# Patient Record
Sex: FEMALE | Race: WHITE | NOT HISPANIC OR LATINO | Employment: FULL TIME | URBAN - METROPOLITAN AREA
[De-identification: names, ages, dates, MRNs, and addresses within clinical notes are randomized per-mention and may not be internally consistent; named-entity substitution may affect disease eponyms.]

---

## 2017-02-10 ENCOUNTER — TELEPHONE (OUTPATIENT)
Dept: OBSTETRICS AND GYNECOLOGY | Age: 37
End: 2017-02-10

## 2017-02-10 RX ORDER — BUPROPION HYDROCHLORIDE 150 MG/1
150 TABLET, EXTENDED RELEASE ORAL 2 TIMES DAILY
Qty: 60 TABLET | Refills: 2 | Status: SHIPPED | OUTPATIENT
Start: 2017-02-10 | End: 2017-03-07 | Stop reason: SDUPTHER

## 2017-02-10 NOTE — TELEPHONE ENCOUNTER
Dr HERMAN  had changed her bcp in Oct because she was having 9 d periods(on Alyacen) to Junel,, ever since she changed bcp like clockwork every month mid cycle she gets a yeast infection, could it be related to bcp.  Also dr herman Rx'd prozac but she quit taken because she had no libido, she has researched that Wellbutrin does not have that effect and requests to have Wellbutrin called in too.

## 2017-02-10 NOTE — TELEPHONE ENCOUNTER
I sent in Wellbutrin.  Not sure about this midcycle vaginitis, if she wants, we can bring her in to do cultures to r/o other causes of the vaginitis, or we can try a different pill to see if bleeding stays controlled and eliminates the vaginal infection

## 2017-02-14 ENCOUNTER — OFFICE VISIT (OUTPATIENT)
Dept: OBSTETRICS AND GYNECOLOGY | Age: 37
End: 2017-02-14

## 2017-02-14 VITALS
BODY MASS INDEX: 21.77 KG/M2 | SYSTOLIC BLOOD PRESSURE: 118 MMHG | DIASTOLIC BLOOD PRESSURE: 72 MMHG | HEIGHT: 59 IN | WEIGHT: 108 LBS

## 2017-02-14 DIAGNOSIS — N92.0 MENORRHAGIA WITH REGULAR CYCLE: ICD-10-CM

## 2017-02-14 DIAGNOSIS — Z30.09 CONTRACEPTIVE EDUCATION: ICD-10-CM

## 2017-02-14 DIAGNOSIS — N76.0 ACUTE VAGINITIS: Primary | ICD-10-CM

## 2017-02-14 PROCEDURE — 99213 OFFICE O/P EST LOW 20 MIN: CPT | Performed by: PHYSICIAN ASSISTANT

## 2017-02-14 RX ORDER — FLUCONAZOLE 150 MG/1
150 TABLET ORAL DAILY
Qty: 2 TABLET | Refills: 0 | Status: SHIPPED | OUTPATIENT
Start: 2017-02-14 | End: 2020-01-07

## 2017-02-14 RX ORDER — FLUTICASONE PROPIONATE 0.05 MG/G
OINTMENT TOPICAL 2 TIMES DAILY
Qty: 1 EACH | Refills: 0 | Status: SHIPPED | OUTPATIENT
Start: 2017-02-14 | End: 2020-01-07

## 2017-02-14 NOTE — PROGRESS NOTES
"Subjective     Chief Complaint   Patient presents with   • Vaginitis     c/o yeast infections more frequent since starting new pill in oct.       Opal Morales is a 37 y.o. No obstetric history on file. whose LMP is Patient's last menstrual period was 01/24/2017 (approximate). presents with recurrent vaginitis issues since she was changed off her previous BCP to improve cycle control (October 2016).  She has noted this since she started on her new pills.  Her bleeding is better but she is tired of the SE.  She has taken a diflucan x 2, most recent was this past Friday.   +d/c, white.  NO VD risk.  She is tired of taking the pill and has noted heavier than usual periods.  She is doing having children and is requesting an ablation    She is otherwise healthy and utd on her exams    She is a regular pt of Dr Cote, new to me.      No Additional Complaints Reported    The following portions of the patient's history were reviewed and updated as appropriate:vital signs, allergies, current medications, past medical history, past social history, past surgical history and problem list      Review of Systems   Pertinent items are noted in HPI.     Objective        Visit Vitals   • /72   • Ht 59\" (149.9 cm)   • Wt 108 lb (49 kg)   • LMP 01/24/2017 (Approximate)   • Breastfeeding No   • BMI 21.81 kg/m2       Physical Exam    General:   alert, comfortable and no distress   Heart: Not performed today   Lungs: Not performed today.   Breast: Not performed today   Neck: na   Abdomen: {Not performed today   CVA: Not performed today   Pelvis: Vaginal: normal mucosa without prolapse or lesions, normal rugae and discharge, white, ph nl, no odor noted, culture obtained  Cervix: normal appearance   Extremities: Not performed today   Neurologic: negative   Psychiatric: Normal affect, judgement, and mood       Lab Review   Labs: No data reviewed     Imaging   No data reviewed    Assessment/Plan     ASSESSMENT  1. Acute vaginitis  "   2. Menorrhagia with regular cycle    3. Contraceptive education        PLAN  1. Plan another round of diflucan and await vaginal cultures  2. Disc ablation, she would need biopsy and possible u/s prior to procedure.  Enc to schedule this with Dr Cantu.  I also discussed that she would need another form of BC and gave her HO to review the ablation and TL  3. Gave sample of Lo Loestrin in meantime as she wants something with lower hormones    Follow up: 4 week(s)    COLTON Joe  2/14/2017

## 2017-02-17 LAB
A VAGINAE DNA VAG QL NAA+PROBE: NORMAL SCORE
BVAB2 DNA VAG QL NAA+PROBE: NORMAL SCORE
C ALBICANS DNA VAG QL NAA+PROBE: NEGATIVE
C GLABRATA DNA VAG QL NAA+PROBE: NEGATIVE
MEGA1 DNA VAG QL NAA+PROBE: NORMAL SCORE

## 2017-03-07 RX ORDER — BUPROPION HYDROCHLORIDE 150 MG/1
150 TABLET, EXTENDED RELEASE ORAL 2 TIMES DAILY
Qty: 180 TABLET | Refills: 2 | Status: SHIPPED | OUTPATIENT
Start: 2017-03-07 | End: 2017-11-27 | Stop reason: SDUPTHER

## 2017-03-22 ENCOUNTER — TELEPHONE (OUTPATIENT)
Dept: OBSTETRICS AND GYNECOLOGY | Age: 37
End: 2017-03-22

## 2017-03-22 ENCOUNTER — PROCEDURE VISIT (OUTPATIENT)
Dept: OBSTETRICS AND GYNECOLOGY | Age: 37
End: 2017-03-22

## 2017-03-22 ENCOUNTER — OFFICE VISIT (OUTPATIENT)
Dept: OBSTETRICS AND GYNECOLOGY | Age: 37
End: 2017-03-22

## 2017-03-22 VITALS
BODY MASS INDEX: 21.17 KG/M2 | SYSTOLIC BLOOD PRESSURE: 102 MMHG | WEIGHT: 105 LBS | HEIGHT: 59 IN | DIASTOLIC BLOOD PRESSURE: 64 MMHG

## 2017-03-22 DIAGNOSIS — Z01.419 ENCOUNTER FOR GYNECOLOGICAL EXAMINATION: Primary | ICD-10-CM

## 2017-03-22 DIAGNOSIS — N92.6 IRREGULAR MENSES: Primary | ICD-10-CM

## 2017-03-22 DIAGNOSIS — N92.0 MENORRHAGIA WITH REGULAR CYCLE: ICD-10-CM

## 2017-03-22 PROCEDURE — 76830 TRANSVAGINAL US NON-OB: CPT | Performed by: OBSTETRICS & GYNECOLOGY

## 2017-03-22 PROCEDURE — 99213 OFFICE O/P EST LOW 20 MIN: CPT | Performed by: OBSTETRICS & GYNECOLOGY

## 2017-03-22 RX ORDER — SODIUM CHLORIDE 0.9 % (FLUSH) 0.9 %
1-10 SYRINGE (ML) INJECTION AS NEEDED
Status: CANCELLED | OUTPATIENT
Start: 2017-03-22

## 2017-03-22 NOTE — PROGRESS NOTES
Subjective         History of Present Illness  Opal Morales is a 37 y.o. female is being seen today for   .        The following portions of the patient's history were reviewed and updated as appropriate: allergies, current medications, past family history, past medical history, past social history, past surgical history and problem list.    PAST MEDICAL HISTORY  No past medical history on file.  OB History     No data available        No past surgical history on file.  No family history on file.  History   Smoking Status   • Never Smoker   Smokeless Tobacco   • Not on file       Current Outpatient Prescriptions:   •  buPROPion SR (WELLBUTRIN SR) 150 MG 12 hr tablet, Take 1 tablet by mouth 2 (Two) Times a Day., Disp: 180 tablet, Rfl: 2  •  fluticasone (CUTIVATE) 0.005 % ointment, Apply  topically 2 (Two) Times a Day., Disp: 1 each, Rfl: 0  •  Norethin-Eth Estrad-Fe Biphas (LO LOESTRIN FE) 1 MG-10 MCG / 10 MCG tablet, Take  by mouth., Disp: , Rfl:   •  fluconazole (DIFLUCAN) 150 MG tablet, Take 1 tablet by mouth Daily. Take one tablet today and repeat in three days if still symptomatic, Disp: 2 tablet, Rfl: 0    There is no immunization history on file for this patient.    Review of Systems       Except as outlined in history of physical illness, patient denies any changes in her GYN, , GI systems. All other systems reviewed are negative.    Objective   Physical Exam      Assessment/Plan   Opal was seen today for gynecologic exam.    Diagnoses and all orders for this visit:    Encounter for gynecological examination  -     IGP, Apt HPV,rfx 16 / 18,45                 EMR Dragon/ Transcription disclaimer:  Much of the encounter note is an electronic transcription/translation of spoken language to printed text. The electronic translation of spoken language may permit erroneous, or at times, nonessential words or phrases to be inadvertently transcribes; Although i have reviewed the note for such errors, some may  still exist.

## 2017-03-22 NOTE — TELEPHONE ENCOUNTER
----- Message from Argelia Matta sent at 3/22/2017 12:27 PM EDT -----  Dr MCGRATH pt, pt was seen today for pre op appt. Pt called stating that she forgot to ask Dr MCGRATH if he would refill her lo Loestrin Fe that Zaida put her on. Please advise.     Pt#: [phone]  Pharm in chart

## 2017-03-22 NOTE — PROGRESS NOTES
"Subjective     Chief Complaint   Patient presents with   • Gynecologic Exam     Annual         History of Present Illness      Opal Morales is a very pleasant  37 y.o. female who presents for annual exam.  Mammo Exam n/a***, Contraception ocp's, Exercise 4 x wkly.      Obstetric History:  OB History     No data available         Menstrual History:     Patient's last menstrual period was 03/19/2017.       Sexual History:       No past medical history on file.  No past surgical history on file.    SOCIAL Hx:      The following portions of the patient's history were reviewed and updated as appropriate: allergies, current medications, past family history, past medical history, past social history, past surgical history and problem list.    Review of Systems        Except as outlined in history of physical illness, patient denies any changes in her GYN, , GI systems.  All other systems reviewed were negative.         Objective   Physical Exam    /64  Ht 59\" (149.9 cm)  Wt 105 lb (47.6 kg)  LMP 03/19/2017  BMI 21.21 kg/m2    General: Patient is alert and oriented and appears overall healthy  Neck: Is supple without thyromegaly, no carotid bruits and no lymphadenopathy  Lungs: Clear bilaterally, no wheezing, rhonchi, or rales.  Respiratory rate is normal  Breast: Even, symmetrical, no lymphadenopathy, no retraction, no masses or cysts  Heart: Regular rate and rhythm are appreciated, no murmurs or rubs are heard  Abdomen: Is soft, without organomegaly, bowel sounds are positive, there is no                                rebound or guarding and palpation does not produce any discomfort  Back: Nontender without CVA tenderness  Pelvic: External genitalia appear normal and consistent with mature female.  BUS normal                            Vagina is clean dry without discharge and appears adequately estrogenized, no               lesions or masses are present                         Cervix is noninflamed " without discharge or lesions.  There is no cervical motion             tenderness.                Uterus is nonenlarged, without tenderness, and no masses or abnormalities are  present               Adnexa are non-enlarged, non tender               Rectal exam reveals adequate sphincter tone and no masses or lesions are                     appreciated on digital rectal examination.    Patient Active Problem List   Diagnosis   • Anxiety   • Irregular menses                 Assessment/Plan   There are no diagnoses linked to this encounter.    Annual Well Woman Exam    Discussed today's findings and concerns with patient in detail.  Continue to recommend regular exercise to include cardiovascular and resistance training and breast self-exam. Wellness lab, mammography, and pap smear, in accordance with age guidelines.  Nutritional and caloric recommendations discussed.    All of the patient's questions were addressed and answered, I have encouraged her to call for today's test results if she has not received them within 10 days.  Patient is advised to call with any change in her condition or with any other questions, otherwise return in 12 months for annual examination.

## 2017-03-22 NOTE — PROGRESS NOTES
Subjective      Chief complaint menorrhagia    Chief Complaint   Patient presents with   • Gynecologic Exam     pre op :ablation       History of Present Illness  Opal Morales is a 37 y.o. female is being seen today for persistent menorrhagia and ultrasound evaluation of endometrium.  Her menorrhagia has been worsening.  It is brought on by cycles.  It is been unresponsive to other therapy and she would like to proceed with some other form of treatment.  Chief Complaint   Patient presents with   • Gynecologic Exam     pre op :ablation   .        The following portions of the patient's history were reviewed and updated as appropriate: allergies, current medications, past family history, past medical history, past social history, past surgical history and problem list.    PAST MEDICAL HISTORY  No past medical history on file.  OB History     No data available        No past surgical history on file.  No family history on file.  History   Smoking Status   • Never Smoker   Smokeless Tobacco   • Not on file       Current Outpatient Prescriptions:   •  buPROPion SR (WELLBUTRIN SR) 150 MG 12 hr tablet, Take 1 tablet by mouth 2 (Two) Times a Day., Disp: 180 tablet, Rfl: 2  •  fluticasone (CUTIVATE) 0.005 % ointment, Apply  topically 2 (Two) Times a Day., Disp: 1 each, Rfl: 0  •  Norethin-Eth Estrad-Fe Biphas (LO LOESTRIN FE) 1 MG-10 MCG / 10 MCG tablet, Take  by mouth., Disp: , Rfl:   •  fluconazole (DIFLUCAN) 150 MG tablet, Take 1 tablet by mouth Daily. Take one tablet today and repeat in three days if still symptomatic, Disp: 2 tablet, Rfl: 0    There is no immunization history on file for this patient.    Review of Systems       Except as outlined in history of physical illness, patient denies any changes in her GYN, , GI systems. All other systems reviewed are negative.    Objective   Physical Exam  Pelvic: External genitalia appear normal and consistent with mature female.  BUS normal                             Vagina is clean dry without discharge and appears adequately estrogenized, no               lesions or masses are present                         Cervix is noninflamed without discharge or lesions.  There is no cervical motion             tenderness.                Uterus is nonenlarged, without tenderness, and no masses or abnormalities are  present               Adnexa are non-enlarged, non tender               Rectal exam reveals adequate sphincter tone and no masses or lesions are                     appreciated on digital rectal examination.    EMR Dragon/ Transcription disclaimer:  Much of the encounter note is an electronic transcription/translation of spoken language to printed text. The electronic translation of spoken language may permit erroneous, or at times, nonessential words or phrases to be inadvertently transcribes; Although i have reviewed the note for such errors, some may still exist.    Assessment/Plan   Opal was seen today for gynecologic exam.    Diagnoses and all orders for this visit:    Encounter for gynecological examination  -     Cancel: IGP, Apt HPV,rfx 16 / 18,45    Menorrhagia with regular cycle    After reviewing the options.  Patient would like to proceed with endometrial ablation risk benefits alternatives complications and potential delay in diagnosis of endometrial carcinoma reviewed.    Other diagnosis contraceptive management.  We reviewed other forms of contraception that are benefits and their alternatives.  We also discussed in detail permanent sterilization.  She is leaning towards having her  get a vasectomy.    She wants to schedule the ablation and we will do so           EMR Dragon/ Transcription disclaimer:  Much of the encounter note is an electronic transcription/translation of spoken language to printed text. The electronic translation of spoken language may permit erroneous, or at times, nonessential words or phrases to be inadvertently transcribes;  Although i have reviewed the note for such errors, some may still exist.

## 2017-03-22 NOTE — PROGRESS NOTES
Subjective     Chief Complaint   Patient presents with   • Gynecologic Exam     pre op :ablation       History of Present Illness  Opal Morales is a 37 y.o. female is being seen today for   Chief Complaint   Patient presents with   • Gynecologic Exam     pre op :ablation   .        The following portions of the patient's history were reviewed and updated as appropriate: allergies, current medications, past family history, past medical history, past social history, past surgical history and problem list.    PAST MEDICAL HISTORY  No past medical history on file.  OB History     No data available        No past surgical history on file.  No family history on file.  History   Smoking Status   • Never Smoker   Smokeless Tobacco   • Not on file       Current Outpatient Prescriptions:   •  buPROPion SR (WELLBUTRIN SR) 150 MG 12 hr tablet, Take 1 tablet by mouth 2 (Two) Times a Day., Disp: 180 tablet, Rfl: 2  •  fluticasone (CUTIVATE) 0.005 % ointment, Apply  topically 2 (Two) Times a Day., Disp: 1 each, Rfl: 0  •  Norethin-Eth Estrad-Fe Biphas (LO LOESTRIN FE) 1 MG-10 MCG / 10 MCG tablet, Take  by mouth., Disp: , Rfl:   •  fluconazole (DIFLUCAN) 150 MG tablet, Take 1 tablet by mouth Daily. Take one tablet today and repeat in three days if still symptomatic, Disp: 2 tablet, Rfl: 0    There is no immunization history on file for this patient.    Review of Systems       Except as outlined in history of physical illness, patient denies any changes in her GYN, , GI systems. All other systems reviewed are negative.    Objective   Physical Exam      Assessment/Plan   Opal was seen today for gynecologic exam.    Diagnoses and all orders for this visit:    Encounter for gynecological examination  -     IGP, Apt HPV,rfx 16 / 18,45                 EMR Dragon/ Transcription disclaimer:  Much of the encounter note is an electronic transcription/translation of spoken language to printed text. The electronic translation of spoken  language may permit erroneous, or at times, nonessential words or phrases to be inadvertently transcribes; Although i have reviewed the note for such errors, some may still exist.

## 2017-10-09 RX ORDER — NORETHINDRONE AND ETHINYL ESTRADIOL 1 MG-35MCG
KIT ORAL
Qty: 84 TABLET | Refills: 0 | Status: SHIPPED | OUTPATIENT
Start: 2017-10-09 | End: 2019-04-08

## 2017-11-27 RX ORDER — BUPROPION HYDROCHLORIDE 150 MG/1
TABLET, EXTENDED RELEASE ORAL
Qty: 180 TABLET | Refills: 2 | Status: SHIPPED | OUTPATIENT
Start: 2017-11-27 | End: 2018-08-25 | Stop reason: SDUPTHER

## 2018-05-08 RX ORDER — NORETHINDRONE ACETATE AND ETHINYL ESTRADIOL, ETHINYL ESTRADIOL AND FERROUS FUMARATE 1MG-10(24)
KIT ORAL
Qty: 84 TABLET | Refills: 3 | Status: SHIPPED | OUTPATIENT
Start: 2018-05-08 | End: 2019-04-08 | Stop reason: SDUPTHER

## 2018-08-28 RX ORDER — BUPROPION HYDROCHLORIDE 150 MG/1
TABLET, EXTENDED RELEASE ORAL
Qty: 180 TABLET | Refills: 2 | Status: SHIPPED | OUTPATIENT
Start: 2018-08-28 | End: 2019-05-23

## 2019-04-08 RX ORDER — NORETHINDRONE ACETATE AND ETHINYL ESTRADIOL, ETHINYL ESTRADIOL AND FERROUS FUMARATE 1MG-10(24)
KIT ORAL
Qty: 84 TABLET | Refills: 3 | Status: SHIPPED | OUTPATIENT
Start: 2019-04-08 | End: 2019-05-23 | Stop reason: SDUPTHER

## 2019-05-23 ENCOUNTER — OFFICE VISIT (OUTPATIENT)
Dept: OBSTETRICS AND GYNECOLOGY | Age: 39
End: 2019-05-23

## 2019-05-23 VITALS
DIASTOLIC BLOOD PRESSURE: 70 MMHG | WEIGHT: 110 LBS | SYSTOLIC BLOOD PRESSURE: 120 MMHG | BODY MASS INDEX: 22.18 KG/M2 | HEIGHT: 59 IN

## 2019-05-23 DIAGNOSIS — F41.9 ANXIETY: ICD-10-CM

## 2019-05-23 DIAGNOSIS — Z01.419 ENCOUNTER FOR GYNECOLOGICAL EXAMINATION: Primary | ICD-10-CM

## 2019-05-23 PROCEDURE — 99396 PREV VISIT EST AGE 40-64: CPT | Performed by: NURSE PRACTITIONER

## 2019-05-23 RX ORDER — SERTRALINE HYDROCHLORIDE 25 MG/1
25 TABLET, FILM COATED ORAL DAILY
Qty: 30 TABLET | Refills: 11 | Status: SHIPPED | OUTPATIENT
Start: 2019-05-23 | End: 2019-06-14 | Stop reason: SDUPTHER

## 2019-05-23 NOTE — PROGRESS NOTES
"Subjective       History of Present Illness    Chief Complaint   Patient presents with   • Gynecologic Exam     annual visit , last pap 16 Ascus , hpv neg , wants to discuss other meds than wellbutrin , doesnt feel like is working .       Opal Morales is a 39 y.o. female who presents for annual exam. No problems other than some anxiety and mood changes. She doesn't think that the wellbutrin is working and she would like to try Zoloft.  She has numerous friends that have had success with it.  She denies any depression and states she feels \"on edge\" all the time.  She is very happy on loloestrin and does not have cycles at all.  She had considered an ablation but doesn't feel that she needs it now.  No bladder or bowel problems.  No new partners.      OB History    Para Term  AB Living   3 3 3     3   SAB TAB Ectopic Molar Multiple Live Births             3      # Outcome Date GA Lbr Odell/2nd Weight Sex Delivery Anes PTL Lv   3 Term     F Vag-Spont   FLAQUITA   2 Term     F Vag-Spont   FLAQUITA   1 Term     M Vag-Spont   FLAQUITA          The following portions of the patient's history were reviewed and updated as appropriate: allergies, current medications, past family history, past medical history, past social history, past surgical history and problem list.    Current contraception: OCP (estrogen/progesterone)  History of abnormal Pap smear: yes - ASCUS  Received Gardasil immunization: no  Perform regular self breast exam: yes - occasional  Family history of uterine or ovarian cancer: no  Family History of colon cancer: no  Family history of breast cancer: no    Mammogram: ordered.  Colonoscopy: not indicated.  DEXA: not indicated.  Last Pap: ASCUS    Social History    Tobacco Use      Smoking status: Never Smoker    Exercise: moderately active  Calcium/Vitamin D: adequate intake    The following portions of the patient's history were reviewed and updated as appropriate: allergies, current " "medications, past family history, past medical history, past social history, past surgical history and problem list.    Review of Systems   Constitutional: Negative.    HENT: Negative.    Eyes: Negative.    Respiratory: Negative.    Cardiovascular: Negative.    Gastrointestinal: Negative.    Endocrine: Negative.    Genitourinary: Negative.    Musculoskeletal: Negative.    Skin: Negative.    Allergic/Immunologic: Negative.    Neurological: Negative.    Hematological: Negative.    Psychiatric/Behavioral: Negative.          Objective   Physical Exam   Constitutional: She is oriented to person, place, and time. She appears well-developed and well-nourished.   Neck: No thyroid mass present.   Cardiovascular: Normal rate, regular rhythm and normal heart sounds.   Pulmonary/Chest: Effort normal and breath sounds normal. Right breast exhibits no mass, no nipple discharge, no skin change and no tenderness. Left breast exhibits no mass, no nipple discharge, no skin change and no tenderness.   Abdominal: Soft. There is no tenderness.   Genitourinary: Vagina normal and uterus normal. There is no rash or lesion on the right labia. There is no rash or lesion on the left labia. Cervix exhibits no motion tenderness, no discharge and no friability. Right adnexum displays no mass and no tenderness. Left adnexum displays no mass and no tenderness.   Neurological: She is alert and oriented to person, place, and time.   Psychiatric: She has a normal mood and affect. Her behavior is normal.   Vitals reviewed.      /70   Ht 149.9 cm (59\")   Wt 49.9 kg (110 lb)   LMP  (LMP Unknown)   Breastfeeding? No   BMI 22.22 kg/m²     Assessment/Plan   Opal was seen today for gynecologic exam.    Diagnoses and all orders for this visit:    Encounter for gynecological examination  -     IGP, Aptima HPV, Rfx 16 / 18,45    Anxiety    Other orders  -     sertraline (ZOLOFT) 25 MG tablet; Take 1 tablet by mouth Daily.  -     Norethin-Eth " Estrad-Fe Biphas (LO LOESTRIN FE) 1 MG-10 MCG / 10 MCG tablet; Take 1 tablet by mouth Daily.        Breast self exam technique reviewed and patient encouraged to perform self-exam monthly.  Discussed healthy lifestyle modifications.  Pap smear done today with HPV  Recommended 30 minutes of aerobic exercise five times per week.  Discussed calcium needs to prevent osteoporosis  Zoloft sent to pharmacy.  If patient does not feel improvement I advised her to see a psychiatrist. She is agreeable.   She will schedule her mammogram and we discussed those should be done yearly now.

## 2019-05-28 LAB
CYTOLOGIST CVX/VAG CYTO: NORMAL
CYTOLOGY CVX/VAG DOC CYTO: NORMAL
CYTOLOGY CVX/VAG DOC THIN PREP: NORMAL
DX ICD CODE: NORMAL
HIV 1 & 2 AB SER-IMP: NORMAL
HPV I/H RISK 4 DNA CVX QL PROBE+SIG AMP: NEGATIVE
OTHER STN SPEC: NORMAL
STAT OF ADQ CVX/VAG CYTO-IMP: NORMAL

## 2019-05-29 ENCOUNTER — TELEPHONE (OUTPATIENT)
Dept: OBSTETRICS AND GYNECOLOGY | Age: 39
End: 2019-05-29

## 2019-05-29 NOTE — TELEPHONE ENCOUNTER
----- Message from AMANDEEP Arnold sent at 5/28/2019  4:12 PM EDT -----  Notify negative pap and HPV

## 2019-06-14 RX ORDER — SERTRALINE HYDROCHLORIDE 25 MG/1
25 TABLET, FILM COATED ORAL DAILY
Qty: 90 TABLET | Refills: 3 | Status: SHIPPED | OUTPATIENT
Start: 2019-06-14 | End: 2020-01-07

## 2019-07-07 RX ORDER — BUPROPION HYDROCHLORIDE 150 MG/1
TABLET, EXTENDED RELEASE ORAL
Qty: 180 TABLET | Refills: 2 | Status: SHIPPED | OUTPATIENT
Start: 2019-07-07 | End: 2020-01-07

## 2019-07-16 ENCOUNTER — PROCEDURE VISIT (OUTPATIENT)
Dept: OBSTETRICS AND GYNECOLOGY | Age: 39
End: 2019-07-16

## 2019-07-16 ENCOUNTER — APPOINTMENT (OUTPATIENT)
Dept: WOMENS IMAGING | Facility: HOSPITAL | Age: 39
End: 2019-07-16

## 2019-07-16 DIAGNOSIS — Z12.31 VISIT FOR SCREENING MAMMOGRAM: Primary | ICD-10-CM

## 2019-07-16 PROCEDURE — 77067 SCR MAMMO BI INCL CAD: CPT | Performed by: OBSTETRICS & GYNECOLOGY

## 2019-07-16 PROCEDURE — 77067 SCR MAMMO BI INCL CAD: CPT | Performed by: RADIOLOGY

## 2020-01-07 ENCOUNTER — OFFICE VISIT (OUTPATIENT)
Dept: FAMILY MEDICINE CLINIC | Facility: CLINIC | Age: 40
End: 2020-01-07

## 2020-01-07 VITALS
WEIGHT: 112 LBS | HEIGHT: 59 IN | DIASTOLIC BLOOD PRESSURE: 76 MMHG | SYSTOLIC BLOOD PRESSURE: 110 MMHG | BODY MASS INDEX: 22.58 KG/M2 | OXYGEN SATURATION: 98 % | TEMPERATURE: 98.3 F | HEART RATE: 69 BPM

## 2020-01-07 DIAGNOSIS — S39.92XA INJURY OF LOW BACK, INITIAL ENCOUNTER: ICD-10-CM

## 2020-01-07 DIAGNOSIS — F41.9 ANXIETY: Primary | ICD-10-CM

## 2020-01-07 PROCEDURE — 99204 OFFICE O/P NEW MOD 45 MIN: CPT | Performed by: FAMILY MEDICINE

## 2020-01-07 NOTE — PROGRESS NOTES
Opal Morales is a 40 y.o. female.     Chief Complaint   Patient presents with   • Establish Care     new pt establishing today with dr auguste   • Anxiety     pt has anxiety issues would like to talk about it setraline is not helping    • Back Pain     lower back pain since september not doing well  goes down to hip worse when sitting        HPI     Pt is a pleasant 40 y.o. YO female here for Anxiety and lower back pain.  New patient to me and this office.       Anxiety: chronic problem - started Fluozetine around 2010, but had issues with decreased labido, then tried wellbruin without help and then had issues not sleeping and then took zoloft and di not controlled.  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  She does notice decreased sleep, no appetite change. No Si/ HI.  Irritable.  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     Patient with a couple months of lower back pain, started when exercising and doing a movement bending forward.  She has noticed a discomfort that is mild and radiates down the left leg, it has improved significantly but has not resolved.    The following portions of the patient's history were reviewed and updated as appropriate: allergies, current medications, past family history, past medical history, past social history, past surgical history and problem list.    Review of Systems   Constitutional: Negative.  Negative for activity change and appetite change.   HENT: Negative.  Negative for congestion and dental problem.    Eyes: Negative.    Respiratory: Negative.  Negative for apnea, choking and chest tightness.    Cardiovascular: Negative.  Negative for chest pain, palpitations and leg swelling.   Gastrointestinal: Negative.    Endocrine: Negative.    Genitourinary: Negative.    Musculoskeletal: Positive for back pain. Negative for arthralgias.   Skin: Negative.    Allergic/Immunologic: Negative.    Neurological: Negative.     Hematological: Negative.    Psychiatric/Behavioral: The patient is nervous/anxious.        Objective  Vitals:    01/07/20 1234   BP: 110/76   Pulse: 69   Temp: 98.3 °F (36.8 °C)   SpO2: 98%        Physical Exam   Constitutional: She is oriented to person, place, and time. She appears well-developed and well-nourished. No distress.   HENT:   Head: Normocephalic.   Nose: Nose normal.   Eyes: EOM are normal.   Cardiovascular: Normal rate, regular rhythm, normal heart sounds and intact distal pulses.   No murmur heard.  Pulmonary/Chest: Effort normal and breath sounds normal. No respiratory distress.   Musculoskeletal: Normal range of motion.   No tenderness along the lumbar spine, no paraspinal neck tenderness, negative leg raise bilaterally.   Neurological: She is alert and oriented to person, place, and time.   Skin: Skin is warm and dry. No rash noted.   Psychiatric: She has a normal mood and affect. Her behavior is normal. Judgment and thought content normal.   Nursing note and vitals reviewed.        Current Outpatient Medications:   •  Norethin-Eth Estrad-Fe Biphas (LO LOESTRIN FE) 1 MG-10 MCG / 10 MCG tablet, Take 1 tablet by mouth Daily., Disp: 84 tablet, Rfl: 3  •  Vortioxetine HBr (TRINTELLIX) 5 MG tablet, Take 5 mg by mouth Daily With Breakfast., Disp: 30 tablet, Rfl: 3    Procedures    Lab Results (most recent)     None              Opal was seen today for establish care, anxiety and back pain.    Diagnoses and all orders for this visit:    Anxiety  -     Vortioxetine HBr (TRINTELLIX) 5 MG tablet; Take 5 mg by mouth Daily With Breakfast.    Injury of low back, initial encounter  -     Ambulatory Referral to Physical Therapy Evaluate and treat    pleasant 40 YOF here as a new patient, with anxiety - not well controlled, she has failed fluoxetine, wellbutrin, zoloft with her ineffective treatment or side effects.  Will transition to Trintellix as this does not have GI side effects or weight gain.   Patient will continue to exercise, eat healthy and will consider CBT therapy.    Patient with lower back pain, did not have improvement with home therapy, will refer to PT, continue with NSAIDs ice and rest.    Return in about 6 weeks (around 2/18/2020), or if symptoms worsen or fail to improve, for Recheck Anxiety and annual at her availability .      Jazmyne Alaniz MD

## 2020-02-18 ENCOUNTER — OFFICE VISIT (OUTPATIENT)
Dept: FAMILY MEDICINE CLINIC | Facility: CLINIC | Age: 40
End: 2020-02-18

## 2020-02-18 VITALS
BODY MASS INDEX: 22.58 KG/M2 | HEIGHT: 59 IN | HEART RATE: 67 BPM | WEIGHT: 112 LBS | DIASTOLIC BLOOD PRESSURE: 72 MMHG | SYSTOLIC BLOOD PRESSURE: 114 MMHG | TEMPERATURE: 98 F | OXYGEN SATURATION: 98 %

## 2020-02-18 DIAGNOSIS — Z13.1 SCREENING FOR DIABETES MELLITUS: ICD-10-CM

## 2020-02-18 DIAGNOSIS — F41.9 ANXIETY: Primary | ICD-10-CM

## 2020-02-18 DIAGNOSIS — Z13.220 SCREENING FOR LIPID DISORDERS: ICD-10-CM

## 2020-02-18 DIAGNOSIS — E55.9 HYPOVITAMINOSIS D: ICD-10-CM

## 2020-02-18 DIAGNOSIS — R19.4 BOWEL HABIT CHANGES: ICD-10-CM

## 2020-02-18 PROCEDURE — 99214 OFFICE O/P EST MOD 30 MIN: CPT | Performed by: FAMILY MEDICINE

## 2020-02-18 NOTE — PROGRESS NOTES
Opal Morales is a 40 y.o. female.     Chief Complaint   Patient presents with   • Anxiety     follow up pt would like to go 10 mg daily ,no complains        HPI     Pt is a pleasant 40 y.o. YO female here for Anxiety and constipation.  Anxiety, improved, sleeping much better, no adverse effects from Trintellix 5 mg.  Adherent with medications.  She was on fluoxetine before that and sertraline before that with improvement but decreased libido.  She does feel that she is kinder, more patient.  Overall less irritable but still feels easily anxious.    Constipation chronic problem.  She has been needed to take senna to have a bowel movement for years.  She does drink a significant amount of water daily, lots of vegetables and fiber.    The following portions of the patient's history were reviewed and updated as appropriate: allergies, current medications, past family history, past medical history, past social history, past surgical history and problem list.    Review of Systems   Constitutional: Negative.  Negative for fatigue.   HENT: Negative.    Eyes: Negative.    Respiratory: Negative.    Cardiovascular: Negative for chest pain, palpitations and leg swelling.   Gastrointestinal: Negative.    Endocrine: Negative.    Genitourinary: Negative.    Musculoskeletal: Negative.    Skin: Negative.    Allergic/Immunologic: Negative.    Neurological: Negative for dizziness and headaches.   Hematological: Negative.    Psychiatric/Behavioral: Negative for sleep disturbance. The patient is nervous/anxious.        Objective  Vitals:    02/18/20 1542   BP: 114/72   Pulse: 67   Temp: 98 °F (36.7 °C)   SpO2: 98%        Physical Exam   Constitutional: She is oriented to person, place, and time. She appears well-developed and well-nourished. No distress.   HENT:   Head: Normocephalic.   Nose: Nose normal.   Eyes: EOM are normal. No scleral icterus.   Pulmonary/Chest: Effort normal. No respiratory distress.   Musculoskeletal:  Normal range of motion.   Neurological: She is alert and oriented to person, place, and time.   Skin: Skin is warm and dry. No rash noted.   Psychiatric: She has a normal mood and affect. Her behavior is normal. Judgment and thought content normal.   Nursing note and vitals reviewed.        Current Outpatient Medications:   •  Norethin-Eth Estrad-Fe Biphas (LO LOESTRIN FE) 1 MG-10 MCG / 10 MCG tablet, Take 1 tablet by mouth Daily., Disp: 84 tablet, Rfl: 3  •  Vortioxetine HBr (TRINTELLIX) 10 MG tablet, Take 10 mg by mouth Daily., Disp: 90 tablet, Rfl: 3    Procedures    Lab Results (most recent)     None              Opal was seen today for anxiety.    Diagnoses and all orders for this visit:    Anxiety  -     Vortioxetine HBr (TRINTELLIX) 10 MG tablet; Take 10 mg by mouth Daily.  -     TSH  -     T4, Free    Screening for lipid disorders  -     Comprehensive Metabolic Panel  -     Lipid Panel    Screening for diabetes mellitus  -     Comprehensive Metabolic Panel    Hypovitaminosis D  -     Vitamin D 25 Hydroxy    Bowel habit changes  -     TSH  -     T4, Free  -     CBC & Differential    Anxiety not well controlled but improving significantly with Trintellix compared to sertraline fluoxetine.  No adverse effects of decreased libido.  No increase from 5 to 10 mg daily.    Patient with chronic constipation, advised to continue with water, high-fiber diet and okay to take senna.  Due for fasting labs, will check thyroid function.    Fasting labs above for annual exam.    Return if symptoms worsen or fail to improve, for Annual physical with [facility] prior .      Jazmyne Aalniz MD

## 2020-02-28 LAB
25(OH)D3+25(OH)D2 SERPL-MCNC: 28.7 NG/ML (ref 30–100)
ALBUMIN SERPL-MCNC: 4.9 G/DL (ref 3.5–5.2)
ALBUMIN/GLOB SERPL: 2.2 G/DL
ALP SERPL-CCNC: 10 U/L (ref 39–117)
ALT SERPL-CCNC: 11 U/L (ref 1–33)
AST SERPL-CCNC: 14 U/L (ref 1–32)
BASOPHILS # BLD AUTO: 0.03 10*3/MM3 (ref 0–0.2)
BASOPHILS NFR BLD AUTO: 0.4 % (ref 0–1.5)
BILIRUB SERPL-MCNC: 0.3 MG/DL (ref 0.2–1.2)
BUN SERPL-MCNC: 12 MG/DL (ref 6–20)
BUN/CREAT SERPL: 18.5 (ref 7–25)
CALCIUM SERPL-MCNC: 9.2 MG/DL (ref 8.6–10.5)
CHLORIDE SERPL-SCNC: 99 MMOL/L (ref 98–107)
CHOLEST SERPL-MCNC: 177 MG/DL (ref 0–200)
CO2 SERPL-SCNC: 25.1 MMOL/L (ref 22–29)
CREAT SERPL-MCNC: 0.65 MG/DL (ref 0.57–1)
EOSINOPHIL # BLD AUTO: 0.04 10*3/MM3 (ref 0–0.4)
EOSINOPHIL NFR BLD AUTO: 0.5 % (ref 0.3–6.2)
ERYTHROCYTE [DISTWIDTH] IN BLOOD BY AUTOMATED COUNT: 12.4 % (ref 12.3–15.4)
GLOBULIN SER CALC-MCNC: 2.2 GM/DL
GLUCOSE SERPL-MCNC: 81 MG/DL (ref 65–99)
HCT VFR BLD AUTO: 39.3 % (ref 34–46.6)
HDLC SERPL-MCNC: 84 MG/DL (ref 40–60)
HGB BLD-MCNC: 12.8 G/DL (ref 12–15.9)
IMM GRANULOCYTES # BLD AUTO: 0.01 10*3/MM3 (ref 0–0.05)
IMM GRANULOCYTES NFR BLD AUTO: 0.1 % (ref 0–0.5)
LDLC SERPL CALC-MCNC: 84 MG/DL (ref 0–100)
LYMPHOCYTES # BLD AUTO: 2.44 10*3/MM3 (ref 0.7–3.1)
LYMPHOCYTES NFR BLD AUTO: 30.7 % (ref 19.6–45.3)
MCH RBC QN AUTO: 30.3 PG (ref 26.6–33)
MCHC RBC AUTO-ENTMCNC: 32.6 G/DL (ref 31.5–35.7)
MCV RBC AUTO: 92.9 FL (ref 79–97)
MONOCYTES # BLD AUTO: 0.41 10*3/MM3 (ref 0.1–0.9)
MONOCYTES NFR BLD AUTO: 5.2 % (ref 5–12)
NEUTROPHILS # BLD AUTO: 5.03 10*3/MM3 (ref 1.7–7)
NEUTROPHILS NFR BLD AUTO: 63.1 % (ref 42.7–76)
NRBC BLD AUTO-RTO: 0 /100 WBC (ref 0–0.2)
PLATELET # BLD AUTO: 331 10*3/MM3 (ref 140–450)
POTASSIUM SERPL-SCNC: 4.5 MMOL/L (ref 3.5–5.2)
PROT SERPL-MCNC: 7.1 G/DL (ref 6–8.5)
RBC # BLD AUTO: 4.23 10*6/MM3 (ref 3.77–5.28)
SODIUM SERPL-SCNC: 138 MMOL/L (ref 136–145)
T4 FREE SERPL-MCNC: 1.15 NG/DL (ref 0.93–1.7)
TRIGL SERPL-MCNC: 47 MG/DL (ref 0–150)
TSH SERPL DL<=0.005 MIU/L-ACNC: 1.9 UIU/ML (ref 0.27–4.2)
VLDLC SERPL CALC-MCNC: 9.4 MG/DL
WBC # BLD AUTO: 7.96 10*3/MM3 (ref 3.4–10.8)

## 2020-04-02 DIAGNOSIS — F41.9 ANXIETY: ICD-10-CM

## 2020-04-06 DIAGNOSIS — F41.9 ANXIETY: ICD-10-CM

## 2020-05-29 RX ORDER — NORETHINDRONE ACETATE AND ETHINYL ESTRADIOL, ETHINYL ESTRADIOL AND FERROUS FUMARATE 1MG-10(24)
KIT ORAL
Qty: 84 TABLET | Refills: 3 | Status: SHIPPED | OUTPATIENT
Start: 2020-05-29 | End: 2021-05-03

## 2021-05-03 RX ORDER — NORETHINDRONE ACETATE AND ETHINYL ESTRADIOL, ETHINYL ESTRADIOL AND FERROUS FUMARATE 1MG-10(24)
KIT ORAL
Qty: 84 TABLET | Refills: 0 | Status: SHIPPED | OUTPATIENT
Start: 2021-05-03 | End: 2021-09-03 | Stop reason: SDUPTHER

## 2021-07-19 RX ORDER — NORETHINDRONE ACETATE AND ETHINYL ESTRADIOL, ETHINYL ESTRADIOL AND FERROUS FUMARATE 1MG-10(24)
KIT ORAL
Qty: 84 TABLET | Refills: 0 | OUTPATIENT
Start: 2021-07-19

## 2021-09-01 ENCOUNTER — APPOINTMENT (OUTPATIENT)
Dept: WOMENS IMAGING | Facility: HOSPITAL | Age: 41
End: 2021-09-01

## 2021-09-01 ENCOUNTER — PROCEDURE VISIT (OUTPATIENT)
Dept: OBSTETRICS AND GYNECOLOGY | Age: 41
End: 2021-09-01

## 2021-09-01 DIAGNOSIS — Z12.31 VISIT FOR SCREENING MAMMOGRAM: Primary | ICD-10-CM

## 2021-09-01 PROCEDURE — 77067 SCR MAMMO BI INCL CAD: CPT | Performed by: RADIOLOGY

## 2021-09-01 PROCEDURE — 77063 BREAST TOMOSYNTHESIS BI: CPT | Performed by: NURSE PRACTITIONER

## 2021-09-01 PROCEDURE — 77063 BREAST TOMOSYNTHESIS BI: CPT | Performed by: RADIOLOGY

## 2021-09-01 PROCEDURE — 77067 SCR MAMMO BI INCL CAD: CPT | Performed by: NURSE PRACTITIONER

## 2021-09-03 ENCOUNTER — OFFICE VISIT (OUTPATIENT)
Dept: OBSTETRICS AND GYNECOLOGY | Age: 41
End: 2021-09-03

## 2021-09-03 VITALS
HEIGHT: 59 IN | DIASTOLIC BLOOD PRESSURE: 62 MMHG | BODY MASS INDEX: 22.78 KG/M2 | SYSTOLIC BLOOD PRESSURE: 106 MMHG | WEIGHT: 113 LBS

## 2021-09-03 DIAGNOSIS — Z01.419 ENCOUNTER FOR GYNECOLOGICAL EXAMINATION: Primary | ICD-10-CM

## 2021-09-03 PROCEDURE — 99396 PREV VISIT EST AGE 40-64: CPT | Performed by: NURSE PRACTITIONER

## 2021-09-03 RX ORDER — NORETHINDRONE ACETATE AND ETHINYL ESTRADIOL, ETHINYL ESTRADIOL AND FERROUS FUMARATE 1MG-10(24)
1 KIT ORAL DAILY
Qty: 84 TABLET | Refills: 0 | Status: SHIPPED | OUTPATIENT
Start: 2021-09-03 | End: 2021-11-19

## 2021-11-19 RX ORDER — NORETHINDRONE ACETATE AND ETHINYL ESTRADIOL, ETHINYL ESTRADIOL AND FERROUS FUMARATE 1MG-10(24)
KIT ORAL
Qty: 84 TABLET | Refills: 0 | Status: SHIPPED | OUTPATIENT
Start: 2021-11-19 | End: 2022-02-21

## 2022-02-21 RX ORDER — NORETHINDRONE ACETATE AND ETHINYL ESTRADIOL, ETHINYL ESTRADIOL AND FERROUS FUMARATE 1MG-10(24)
1 KIT ORAL DAILY
Qty: 84 TABLET | Refills: 1 | Status: SHIPPED | OUTPATIENT
Start: 2022-02-21 | End: 2022-10-17 | Stop reason: SDUPTHER

## 2022-02-21 RX ORDER — NORETHINDRONE ACETATE AND ETHINYL ESTRADIOL, ETHINYL ESTRADIOL AND FERROUS FUMARATE 1MG-10(24)
KIT ORAL
Qty: 84 TABLET | Refills: 0 | Status: SHIPPED | OUTPATIENT
Start: 2022-02-21 | End: 2022-02-21 | Stop reason: SDUPTHER

## 2022-10-10 RX ORDER — NORETHINDRONE ACETATE AND ETHINYL ESTRADIOL, ETHINYL ESTRADIOL AND FERROUS FUMARATE 1MG-10(24)
KIT ORAL
Qty: 84 TABLET | Refills: 1 | OUTPATIENT
Start: 2022-10-10

## 2022-10-17 ENCOUNTER — TELEPHONE (OUTPATIENT)
Dept: OBSTETRICS AND GYNECOLOGY | Age: 42
End: 2022-10-17

## 2022-10-17 RX ORDER — NORETHINDRONE ACETATE AND ETHINYL ESTRADIOL, ETHINYL ESTRADIOL AND FERROUS FUMARATE 1MG-10(24)
1 KIT ORAL DAILY
Qty: 84 TABLET | Refills: 1 | Status: CANCELLED | OUTPATIENT
Start: 2022-10-17

## 2022-10-17 RX ORDER — NORETHINDRONE ACETATE AND ETHINYL ESTRADIOL, ETHINYL ESTRADIOL AND FERROUS FUMARATE 1MG-10(24)
1 KIT ORAL DAILY
Qty: 84 TABLET | Refills: 1 | Status: SHIPPED | OUTPATIENT
Start: 2022-10-17 | End: 2023-02-28 | Stop reason: SDUPTHER

## 2022-10-17 NOTE — TELEPHONE ENCOUNTER
Pt is requesting refill of her lo loestrin till her appt in feb. Sent to [facility] jonny/ kylie.

## 2022-11-01 ENCOUNTER — OFFICE VISIT (OUTPATIENT)
Dept: FAMILY MEDICINE CLINIC | Facility: CLINIC | Age: 42
End: 2022-11-01

## 2022-11-01 VITALS
WEIGHT: 118 LBS | BODY MASS INDEX: 22.28 KG/M2 | SYSTOLIC BLOOD PRESSURE: 122 MMHG | HEART RATE: 84 BPM | TEMPERATURE: 97.9 F | OXYGEN SATURATION: 98 % | DIASTOLIC BLOOD PRESSURE: 76 MMHG | HEIGHT: 61 IN

## 2022-11-01 DIAGNOSIS — F41.9 ANXIETY: ICD-10-CM

## 2022-11-01 DIAGNOSIS — N92.6 IRREGULAR MENSES: ICD-10-CM

## 2022-11-01 DIAGNOSIS — Z23 NEED FOR VACCINATION: Primary | ICD-10-CM

## 2022-11-01 PROCEDURE — 90471 IMMUNIZATION ADMIN: CPT | Performed by: FAMILY MEDICINE

## 2022-11-01 PROCEDURE — 90686 IIV4 VACC NO PRSV 0.5 ML IM: CPT | Performed by: FAMILY MEDICINE

## 2022-11-01 PROCEDURE — 99214 OFFICE O/P EST MOD 30 MIN: CPT | Performed by: FAMILY MEDICINE

## 2022-11-01 RX ORDER — DESVENLAFAXINE 25 MG/1
25 TABLET, EXTENDED RELEASE ORAL DAILY
Qty: 30 TABLET | Refills: 2 | Status: SHIPPED | OUTPATIENT
Start: 2022-11-01 | End: 2023-01-23 | Stop reason: SDUPTHER

## 2022-11-01 NOTE — PROGRESS NOTES
"Answers for HPI/ROS submitted by the patient on 10/26/2022  Please describe your symptoms.: I have no symptoms. 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  She is under a lot of stress. She does feel like everything is on her nerves.   When fabby was on the trintellex and does not remembered that it did not help.     Prozac, zoloft, wellbutrin and trintellix      She also has not had a period in years that she takes Loestrin daily.  She has noticed more weight gain and changes to her body, wondering if this could be a sign of menopause.  Requesting labs.    Objective   Vital Signs:  /76   Pulse 84   Temp 97.9 °F (36.6 °C)   Ht 154.9 cm (61\")   Wt 53.5 kg (118 lb)   SpO2 98%   BMI 22.30 kg/m²   Estimated body mass index is 22.3 kg/m² as calculated from the following:    Height as of this encounter: 154.9 cm (61\").    Weight as of this encounter: 53.5 kg (118 lb).    BMI is within normal parameters. No other follow-up for BMI required.      Physical Exam  Vitals and nursing note reviewed.   Constitutional:       General: She is not in acute distress.     Appearance: She is well-developed.   HENT:      Head: Normocephalic.      Nose: Nose normal.   Eyes:      General: No scleral icterus.  Pulmonary:      Effort: Pulmonary effort is normal. No respiratory distress.   Musculoskeletal:         General: Normal range of motion.   Skin:     General: Skin is warm " and dry.      Findings: No rash.   Neurological:      Mental Status: She is alert and oriented to person, place, and time.   Psychiatric:         Behavior: Behavior normal.         Thought Content: Thought content normal.         Judgment: Judgment normal.        Result Review :                Assessment and Plan {CC Problem List  Visit Diagnosis   ROS  Review (Popup)  Health Maintenance  Quality  BestPractice  Medications  SmartSets  SnapShot Encounters  Media :23}  Diagnoses and all orders for this visit:    1. Need for vaccination (Primary)  -     FluLaval/Fluzone >6 mos ([id number])    2. Anxiety  -     Desvenlafaxine Succinate ER 25 MG tablet sustained-release 24 hour; Take 1 tablet by mouth Daily.  Dispense: 30 tablet; Refill: 2    3. Irregular menses  -     Comprehensive Metabolic Panel; Future  -     CBC & Differential; Future  -     Lipid Panel; Future  -     TSH Rfx On Abnormal To Free T4; Future  -     Follicle stimulating hormone; Future  -     Luteinizing hormone; Future    Denies anxiety not well controlled, see above her meds tried and failed.  She had GeneSight testing, will start a very low-dose follow-up in 3 months    In terms of irregular menstrual cycles, she is on chronic Loestrin, unsure if she is menopausal has not had a period in years.  We will get labs as above.         Follow Up   Return in about 3 months (around 2/1/2023), or if symptoms worsen or fail to improve, for Recheck DILIA with labs prior .  Patient was given instructions and counseling regarding her condition or for health maintenance advice. Please see specific information pulled into the AVS if appropriate. Medical assistant and I wore mask and eyewear protection during entire encounter.  Patient wore mask.    Jazmyne Alaniz MD

## 2023-01-23 DIAGNOSIS — F41.9 ANXIETY: ICD-10-CM

## 2023-01-23 RX ORDER — DESVENLAFAXINE 25 MG/1
25 TABLET, EXTENDED RELEASE ORAL DAILY
Qty: 90 TABLET | Refills: 2 | Status: SHIPPED | OUTPATIENT
Start: 2023-01-23

## 2023-02-06 ENCOUNTER — OFFICE VISIT (OUTPATIENT)
Dept: FAMILY MEDICINE CLINIC | Facility: CLINIC | Age: 43
End: 2023-02-06
Payer: COMMERCIAL

## 2023-02-06 VITALS
BODY MASS INDEX: 21.52 KG/M2 | OXYGEN SATURATION: 100 % | DIASTOLIC BLOOD PRESSURE: 68 MMHG | HEIGHT: 61 IN | TEMPERATURE: 98 F | SYSTOLIC BLOOD PRESSURE: 110 MMHG | HEART RATE: 75 BPM | WEIGHT: 114 LBS

## 2023-02-06 DIAGNOSIS — M21.619 BUNION OF GREAT TOE: ICD-10-CM

## 2023-02-06 DIAGNOSIS — F41.9 ANXIETY: Primary | ICD-10-CM

## 2023-02-06 PROCEDURE — 99213 OFFICE O/P EST LOW 20 MIN: CPT | Performed by: FAMILY MEDICINE

## 2023-02-06 NOTE — PROGRESS NOTES
"Answers for HPI/ROS submitted by the patient on 2/6/2023  Please describe your symptoms.: No symptoms. This is. Follow up visit  Have you had these symptoms before?: No  How long have you been having these symptoms?: 1-4 days  What is the primary reason for your visit?: Other    Chief Complaint  Anxiety (Excellent with medication feeling good !)    Subjective        Opal Morales presents to [facility]  History of Present Illness  Pleasant 43-year-old female here to follow-up for generalized anxiety, at the last appointment we initiated desvenlafaxine 25 mg daily.  She has tried Prozac, Zoloft, Wellbutrin and Trintellix with adverse effects.  She feels less irritable, no adverse effects.  If she wakes up it is hard to get to sleep if she takes it too late.     Some toe pain and pain on the bunion and the top of the foot.  She does have bunions, this limits her ability to wear certain shoes and walk without significant pain.    Objective   Vital Signs:  /68   Pulse 75   Temp 98 °F (36.7 °C)   Ht 154.9 cm (61\")   Wt 51.7 kg (114 lb)   SpO2 100%   BMI 21.54 kg/m²   Estimated body mass index is 21.54 kg/m² as calculated from the following:    Height as of this encounter: 154.9 cm (61\").    Weight as of this encounter: 51.7 kg (114 lb).       BMI is within normal parameters. No other follow-up for BMI required.      Physical Exam  Vitals and nursing note reviewed.   Constitutional:       General: She is not in acute distress.     Appearance: She is well-developed.   HENT:      Head: Normocephalic.      Nose: Nose normal.   Eyes:      General: No scleral icterus.  Pulmonary:      Effort: Pulmonary effort is normal. No respiratory distress.   Musculoskeletal:         General: Normal range of motion.   Skin:     General: Skin is warm and dry.      Findings: No rash.   Neurological:      Mental Status: She is alert and oriented to person, place, and time.   Psychiatric:         " Behavior: Behavior normal.         Thought Content: Thought content normal.         Judgment: Judgment normal.        Result Review :                   Assessment and Plan   Diagnoses and all orders for this visit:    1. Anxiety (Primary)  Assessment & Plan:  Generalized anxiety well controlled on desvenlafaxine 25 mg daily, started November 2022.  She has failed Prozac, Zoloft, Wellbutrin and Trintellix with adverse effects.  Plan to continue same and follow-up in 6 months.      2. Bunion of great toe    In terms of bunions, gave her 3 names of podiatrist in the area, she will call to make an appointment if interested.  In general I would recommend using wide toe shoes, continue with toe spacers and regular stretching exercises.    Labs previously ordered, performed today.       Follow Up   Return in about 6 months (around 8/6/2023), or if symptoms worsen or fail to improve, for Annual Exam with fasting labs prior.  Patient was given instructions and counseling regarding her condition or for health maintenance advice. Please see specific information pulled into the AVS if appropriate. Medical assistant and I wore mask and eyewear protection during entire encounter.  Patient wore mask.    Jazmyne Alaniz MD

## 2023-02-06 NOTE — ASSESSMENT & PLAN NOTE
Generalized anxiety well controlled on desvenlafaxine 25 mg daily, started November 2022.  She has failed Prozac, Zoloft, Wellbutrin and Trintellix with adverse effects.  Plan to continue same and follow-up in 6 months.

## 2023-02-28 ENCOUNTER — PROCEDURE VISIT (OUTPATIENT)
Dept: OBSTETRICS AND GYNECOLOGY | Age: 43
End: 2023-02-28
Payer: COMMERCIAL

## 2023-02-28 ENCOUNTER — OFFICE VISIT (OUTPATIENT)
Dept: OBSTETRICS AND GYNECOLOGY | Age: 43
End: 2023-02-28
Payer: COMMERCIAL

## 2023-02-28 VITALS
SYSTOLIC BLOOD PRESSURE: 110 MMHG | BODY MASS INDEX: 23.18 KG/M2 | HEIGHT: 59 IN | DIASTOLIC BLOOD PRESSURE: 64 MMHG | WEIGHT: 115 LBS

## 2023-02-28 DIAGNOSIS — Z12.31 VISIT FOR SCREENING MAMMOGRAM: Primary | ICD-10-CM

## 2023-02-28 DIAGNOSIS — Z01.419 WELL WOMAN EXAM WITH ROUTINE GYNECOLOGICAL EXAM: Primary | ICD-10-CM

## 2023-02-28 DIAGNOSIS — Z30.41 ORAL CONTRACEPTIVE PILL SURVEILLANCE: ICD-10-CM

## 2023-02-28 DIAGNOSIS — Z12.4 SCREENING FOR CERVICAL CANCER: ICD-10-CM

## 2023-02-28 PROCEDURE — 77063 BREAST TOMOSYNTHESIS BI: CPT | Performed by: OBSTETRICS & GYNECOLOGY

## 2023-02-28 PROCEDURE — 99396 PREV VISIT EST AGE 40-64: CPT | Performed by: NURSE PRACTITIONER

## 2023-02-28 PROCEDURE — 77067 SCR MAMMO BI INCL CAD: CPT | Performed by: OBSTETRICS & GYNECOLOGY

## 2023-02-28 RX ORDER — NORETHINDRONE ACETATE AND ETHINYL ESTRADIOL, ETHINYL ESTRADIOL AND FERROUS FUMARATE 1MG-10(24)
1 KIT ORAL DAILY
Qty: 84 TABLET | Refills: 3 | Status: SHIPPED | OUTPATIENT
Start: 2023-02-28

## 2023-02-28 NOTE — PROGRESS NOTES
Subjective     Chief Complaint   Patient presents with   • Gynecologic Exam     AE. last pap 2019 neg/hpv neg, m/g 2021  Cc;  no problem       History of Present Illness    Opal Morales is a 43 y.o.  who presents for annual exam.    No problems or concerns  She is happy on loloestrin fe and would like to continue.  Only has menses every 6 months or so   No dyspareunia, bowel or bladder problems  No changes in medical history  PCP - SERVANDO Riddle      Obstetric History:  OB History        3    Para   3    Term   3            AB        Living   3       SAB        IAB        Ectopic        Molar        Multiple        Live Births   3               Menstrual History:     Patient's last menstrual period was 2023.         Current contraception: OCP (estrogen/progesterone)  History of abnormal Pap smear: yes - ascus  Received Gardasil immunization: no  Perform regular self breast exam: yes - .  Family history of uterine or ovarian cancer: no  Family History of colon cancer: no  Family history of breast cancer: no    Mammogram: ordered.  Colonoscopy: not indicated.  DEXA: not indicated.    Exercise: moderately active - burn boot camp   Calcium/Vitamin D: adequate intake    The following portions of the patient's history were reviewed and updated as appropriate: allergies, current medications, past family history, past medical history, past social history, past surgical history and problem list.    Review of Systems   Constitutional: Negative.    Respiratory: Negative.    Cardiovascular: Negative.    Gastrointestinal: Negative.    Genitourinary: Negative.    Skin: Negative.    Psychiatric/Behavioral: Negative.            Objective   Physical Exam  Constitutional:       General: She is awake.      Appearance: Normal appearance. She is well-developed.   HENT:      Head: Normocephalic and atraumatic.      Nose: Nose normal.   Neck:      Thyroid: No thyroid mass, thyromegaly or  thyroid tenderness.   Cardiovascular:      Rate and Rhythm: Normal rate and regular rhythm.      Pulses: Normal pulses.      Heart sounds: Normal heart sounds.   Pulmonary:      Effort: Pulmonary effort is normal.      Breath sounds: Normal breath sounds.   Chest:   Breasts:     Breasts are symmetrical.      Right: Normal. No swelling, bleeding, inverted nipple, mass, nipple discharge, skin change or tenderness.      Left: Normal. No swelling, bleeding, inverted nipple, mass, nipple discharge, skin change or tenderness.   Abdominal:      General: Abdomen is flat. Bowel sounds are normal.      Palpations: Abdomen is soft.      Tenderness: There is no abdominal tenderness.   Genitourinary:     General: Normal vulva.      Labia:         Right: No rash, tenderness, lesion or injury.         Left: No rash, tenderness, lesion or injury.       Urethra: No prolapse, urethral pain, urethral swelling or urethral lesion.      Vagina: Normal. No signs of injury. No vaginal discharge, erythema, tenderness, bleeding, lesions or prolapsed vaginal walls.      Cervix: No discharge, friability, lesion, erythema or cervical bleeding.      Uterus: Normal. Not enlarged, not tender and no uterine prolapse.       Adnexa: Right adnexa normal and left adnexa normal.        Right: No mass, tenderness or fullness.          Left: No mass, tenderness or fullness.        Rectum: Normal. No mass.   Musculoskeletal:      Cervical back: Normal range of motion and neck supple.   Lymphadenopathy:      Upper Body:      Right upper body: No supraclavicular adenopathy.      Left upper body: No supraclavicular adenopathy.   Skin:     General: Skin is warm and dry.   Neurological:      General: No focal deficit present.      Mental Status: She is alert and oriented to person, place, and time.   Psychiatric:         Mood and Affect: Mood normal.         Behavior: Behavior normal. Behavior is cooperative.         Thought Content: Thought content normal.     "     Judgment: Judgment normal.         /64   Ht 149.9 cm (59\")   Wt 52.2 kg (115 lb)   LMP 01/28/2023   BMI 23.23 kg/m²     Assessment & Plan   Diagnoses and all orders for this visit:    1. Well woman exam with routine gynecological exam (Primary)    2. Screening for cervical cancer  -     IGP, Apt HPV,rfx 16 / 18,45    3. Oral contraceptive pill surveillance    Other orders  -     Norethin-Eth Estrad-Fe Biphas (Lo Loestrin Fe) 1 MG-10 MCG / 10 MCG tablet; Take 1 tablet by mouth Daily.  Dispense: 84 tablet; Refill: 3        All questions answered.  Breast self exam technique reviewed and patient encouraged to perform self-exam monthly.  Discussed healthy lifestyle modifications.  Recommended 30 minutes of aerobic exercise five times per week.  Discussed calcium needs to prevent osteoporosis.    -Pap smear and mammogram today  -OCP's refilled  -F/u 1 year, sooner prn                "

## 2023-08-29 DIAGNOSIS — Z12.31 VISIT FOR SCREENING MAMMOGRAM: Primary | ICD-10-CM

## 2023-10-02 ENCOUNTER — TELEPHONE (OUTPATIENT)
Dept: OBSTETRICS AND GYNECOLOGY | Age: 43
End: 2023-10-02
Payer: COMMERCIAL

## 2023-10-02 RX ORDER — NORETHINDRONE ACETATE AND ETHINYL ESTRADIOL, ETHINYL ESTRADIOL AND FERROUS FUMARATE 1MG-10(24)
1 KIT ORAL DAILY
Qty: 84 TABLET | Refills: 0 | Status: SHIPPED | OUTPATIENT
Start: 2023-10-02

## 2023-10-02 NOTE — TELEPHONE ENCOUNTER
Pt calling asking for refill on her BC pill. Pt last AE 2/28/23 & next AE scheduled on 3/5/24. Pt's pharmacy verified & on file. Please advise.

## 2023-10-20 ENCOUNTER — TELEPHONE (OUTPATIENT)
Dept: OBSTETRICS AND GYNECOLOGY | Age: 43
End: 2023-10-20
Payer: COMMERCIAL

## 2023-10-20 NOTE — TELEPHONE ENCOUNTER
"Patient states she has been on the birth control pill for years but did have any to take last month bc she lost the pack.  She states she started her menses on Sunday and \"it is horrible\".  She states she has changed her Ultra tampon 4-5 times since 6AM.  She states when she sits on the toilet blood \"gushes\" out of her.  She does have a pack of pill to start Sunday if that is ok?  Pharmacy has been verified.  Please advise.  "

## 2024-01-03 RX ORDER — NORETHINDRONE ACETATE AND ETHINYL ESTRADIOL, ETHINYL ESTRADIOL AND FERROUS FUMARATE 1MG-10(24)
1 KIT ORAL DAILY
Qty: 84 TABLET | Refills: 0 | Status: SHIPPED | OUTPATIENT
Start: 2024-01-03

## 2024-01-15 DIAGNOSIS — F41.9 ANXIETY: ICD-10-CM

## 2024-01-15 RX ORDER — DESVENLAFAXINE 25 MG/1
25 TABLET, EXTENDED RELEASE ORAL DAILY
Qty: 90 TABLET | Refills: 2 | Status: SHIPPED | OUTPATIENT
Start: 2024-01-15

## 2024-03-21 RX ORDER — NORETHINDRONE ACETATE AND ETHINYL ESTRADIOL, ETHINYL ESTRADIOL AND FERROUS FUMARATE 1MG-10(24)
1 KIT ORAL DAILY
Qty: 84 TABLET | Refills: 0 | Status: SHIPPED | OUTPATIENT
Start: 2024-03-21

## 2024-06-05 ENCOUNTER — TELEPHONE (OUTPATIENT)
Dept: OBSTETRICS AND GYNECOLOGY | Age: 44
End: 2024-06-05

## 2024-06-05 NOTE — TELEPHONE ENCOUNTER
Caller: Opal Morales    Relationship to patient: Self    Best call back number: [phone] / LVM    Type of visit: MAMMO    Requested date: 06/13/24    If rescheduling, when is the original appointment: NO    Additional notes: PT HAS ANNUAL ON 06/13/24 @ 11am - PT IS NEEDING A MAMMO APPT SCHEDULED FOR THE SAME DAY AROUND THE SAME TIME    PLEASE CALL THE PT TO SCHEDULE    THANK YOU!

## 2024-06-13 ENCOUNTER — OFFICE VISIT (OUTPATIENT)
Dept: OBSTETRICS AND GYNECOLOGY | Age: 44
End: 2024-06-13
Payer: COMMERCIAL

## 2024-06-13 VITALS
BODY MASS INDEX: 19.96 KG/M2 | WEIGHT: 99 LBS | HEIGHT: 59 IN | SYSTOLIC BLOOD PRESSURE: 100 MMHG | DIASTOLIC BLOOD PRESSURE: 62 MMHG

## 2024-06-13 DIAGNOSIS — Z30.41 ORAL CONTRACEPTIVE PILL SURVEILLANCE: ICD-10-CM

## 2024-06-13 DIAGNOSIS — Z12.31 ENCOUNTER FOR SCREENING MAMMOGRAM FOR MALIGNANT NEOPLASM OF BREAST: ICD-10-CM

## 2024-06-13 DIAGNOSIS — Z01.419 WELL WOMAN EXAM WITH ROUTINE GYNECOLOGICAL EXAM: Primary | ICD-10-CM

## 2024-06-13 RX ORDER — NORETHINDRONE ACETATE AND ETHINYL ESTRADIOL, ETHINYL ESTRADIOL AND FERROUS FUMARATE 1MG-10(24)
1 KIT ORAL DAILY
Qty: 84 TABLET | Refills: 4 | Status: SHIPPED | OUTPATIENT
Start: 2024-06-13

## 2024-06-13 NOTE — PROGRESS NOTES
Subjective     Chief Complaint   Patient presents with    Gynecologic Exam     AE, last pap , mg 2024  Cc:  no problems       History of Present Illness    Opal Morales is a 44 y.o.  who presents for annual exam.    No problems or concerns  She is happy on loloestrin fe and would like to continue.  Only has menses every 6 months or so   No dyspareunia, bowel or bladder problems  No changes in medical history  PCP - SERVANDO Riddle     Obstetric History:  OB History          3    Para   3    Term   3            AB        Living   3         SAB        IAB        Ectopic        Molar        Multiple        Live Births   3               Menstrual History:     No LMP recorded. (Menstrual status: Oral contraceptives).         Current contraception: OCP (estrogen/progesterone)  History of abnormal Pap smear: yes - ascus  Received Gardasil immunization: no  Perform regular self breast exam: yes - .  Family history of uterine or ovarian cancer: no  Family History of colon cancer: no  Family history of breast cancer: no    Mammogram: up to date.  Colonoscopy: recommended.  DEXA: not indicated.    Exercise: very active  Calcium/Vitamin D: adequate intake    The following portions of the patient's history were reviewed and updated as appropriate: allergies, current medications, past family history, past medical history, past social history, past surgical history, and problem list.    Review of Systems   Constitutional: Negative.    Respiratory: Negative.     Cardiovascular: Negative.    Gastrointestinal: Negative.    Genitourinary: Negative.    Skin: Negative.    Psychiatric/Behavioral: Negative.             Objective   Physical Exam  Constitutional:       General: She is awake.      Appearance: Normal appearance. She is well-developed.   HENT:      Head: Normocephalic and atraumatic.      Nose: Nose normal.   Neck:      Thyroid: No thyroid mass, thyromegaly or thyroid tenderness.    Cardiovascular:      Rate and Rhythm: Normal rate and regular rhythm.      Pulses: Normal pulses.      Heart sounds: Normal heart sounds.   Pulmonary:      Effort: Pulmonary effort is normal.      Breath sounds: Normal breath sounds.   Chest:   Breasts:     Breasts are symmetrical.      Right: Normal. No swelling, bleeding, inverted nipple, mass, nipple discharge, skin change or tenderness.      Left: Normal. No swelling, bleeding, inverted nipple, mass, nipple discharge, skin change or tenderness.   Abdominal:      General: Abdomen is flat. Bowel sounds are normal.      Palpations: Abdomen is soft.      Tenderness: There is no abdominal tenderness.   Genitourinary:     General: Normal vulva.      Labia:         Right: No rash, tenderness, lesion or injury.         Left: No rash, tenderness, lesion or injury.       Urethra: No prolapse, urethral pain, urethral swelling or urethral lesion.      Vagina: Normal. No signs of injury. No vaginal discharge, erythema, tenderness, bleeding, lesions or prolapsed vaginal walls.      Cervix: Normal. No discharge, friability, lesion, erythema or cervical bleeding.      Uterus: Normal. Not enlarged, not tender and no uterine prolapse.       Adnexa: Right adnexa normal and left adnexa normal.        Right: No mass, tenderness or fullness.          Left: No mass, tenderness or fullness.        Rectum: Normal. No mass.   Musculoskeletal:      Cervical back: Normal range of motion and neck supple.   Lymphadenopathy:      Upper Body:      Right upper body: No supraclavicular adenopathy.      Left upper body: No supraclavicular adenopathy.   Skin:     General: Skin is warm and dry.   Neurological:      General: No focal deficit present.      Mental Status: She is alert and oriented to person, place, and time.   Psychiatric:         Mood and Affect: Mood normal.         Behavior: Behavior normal. Behavior is cooperative.         Thought Content: Thought content normal.          "Judgment: Judgment normal.         /62   Ht 149.9 cm (59\")   Wt 44.9 kg (99 lb)   BMI 20.00 kg/m²     Assessment & Plan   Diagnoses and all orders for this visit:    1. Well woman exam with routine gynecological exam (Primary)    2. Oral contraceptive pill surveillance    3. Encounter for screening mammogram for malignant neoplasm of breast  -     Mammo Screening Digital Tomosynthesis Bilateral With CAD; Future    Other orders  -     Norethin-Eth Estrad-Fe Biphas (Lo Loestrin Fe) 1 MG-10 MCG / 10 MCG tablet; Take 1 tablet by mouth Daily.  Dispense: 84 tablet; Refill: 4        All questions answered.  Breast self exam technique reviewed and patient encouraged to perform self-exam monthly.  Discussed healthy lifestyle modifications.  Recommended 30 minutes of aerobic exercise five times per week.  Discussed calcium needs to prevent osteoporosis.    -Pap UTD  -F/u 1 year               "

## 2024-06-14 ENCOUNTER — HOSPITAL ENCOUNTER (OUTPATIENT)
Facility: HOSPITAL | Age: 44
Discharge: HOME | End: 2024-06-14
Admitting: OBSTETRICS & GYNECOLOGY
Payer: COMMERCIAL

## 2024-06-14 DIAGNOSIS — Z12.31 VISIT FOR SCREENING MAMMOGRAM: ICD-10-CM

## 2024-06-14 PROCEDURE — 77067 SCR MAMMO BI INCL CAD: CPT

## 2024-06-14 PROCEDURE — 77063 BREAST TOMOSYNTHESIS BI: CPT

## 2024-07-08 ENCOUNTER — OFFICE VISIT (OUTPATIENT)
Dept: FAMILY MEDICINE CLINIC | Facility: CLINIC | Age: 44
End: 2024-07-08
Payer: COMMERCIAL

## 2024-07-08 VITALS
DIASTOLIC BLOOD PRESSURE: 80 MMHG | OXYGEN SATURATION: 98 % | HEIGHT: 59 IN | SYSTOLIC BLOOD PRESSURE: 122 MMHG | BODY MASS INDEX: 20.12 KG/M2 | TEMPERATURE: 97.6 F | WEIGHT: 99.8 LBS | HEART RATE: 77 BPM

## 2024-07-08 DIAGNOSIS — Z23 NEED FOR DIPHTHERIA-TETANUS-PERTUSSIS (TDAP) VACCINE: ICD-10-CM

## 2024-07-08 DIAGNOSIS — Z13.0 SCREENING, ANEMIA, DEFICIENCY, IRON: ICD-10-CM

## 2024-07-08 DIAGNOSIS — Z00.00 HEALTH MAINTENANCE EXAMINATION: Primary | ICD-10-CM

## 2024-07-08 DIAGNOSIS — Z12.11 SCREEN FOR COLON CANCER: ICD-10-CM

## 2024-07-08 DIAGNOSIS — N95.1 PERIMENOPAUSAL: ICD-10-CM

## 2024-07-08 DIAGNOSIS — Z13.29 SCREENING FOR THYROID DISORDER: ICD-10-CM

## 2024-07-08 DIAGNOSIS — Z11.59 ENCOUNTER FOR HEPATITIS C SCREENING TEST FOR LOW RISK PATIENT: ICD-10-CM

## 2024-07-08 DIAGNOSIS — Z13.1 SCREENING FOR DIABETES MELLITUS: ICD-10-CM

## 2024-07-08 DIAGNOSIS — Z13.220 SCREENING FOR LIPID DISORDERS: ICD-10-CM

## 2024-07-08 PROCEDURE — 90471 IMMUNIZATION ADMIN: CPT | Performed by: FAMILY MEDICINE

## 2024-07-08 PROCEDURE — 99396 PREV VISIT EST AGE 40-64: CPT | Performed by: FAMILY MEDICINE

## 2024-07-08 PROCEDURE — 90715 TDAP VACCINE 7 YRS/> IM: CPT | Performed by: FAMILY MEDICINE

## 2024-07-08 NOTE — PROGRESS NOTES
"Chief Complaint  Annual Exam (Pt fasting for labs )    Subjective        Opal Morales presents to [facility]  History of Present Illness  Annual Exam.    Health Maintenance   Topic Date Due    COLORECTAL CANCER SCREENING  Never done    HEPATITIS C SCREENING  Never done    ANNUAL PHYSICAL  Never done    COVID-19 Vaccine (4 - 2023-24 season) 09/01/2023    INFLUENZA VACCINE  08/01/2024    PAP SMEAR  02/28/2026    MAMMOGRAM  06/14/2026    TDAP/TD VACCINES (2 - Td or Tdap) 07/08/2034    Pneumococcal Vaccine 0-64  Aged Out     Diet: eating healthy   Exercise: going to [employer] 4 days a week   GYN: UTD with GYN   Immunizations: UTD   Colon cancer screening: discussed options   Sleep/mental health: doing great, sleeping well.  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  Dentist: UTD  Vision: UTD   Derm: no concern.     Objective   Vital Signs:  /80   Pulse 77   Temp 97.6 °F (36.4 °C)   Ht 149.9 cm (59\")   Wt 45.3 kg (99 lb 12.8 oz)   SpO2 98%   BMI 20.16 kg/m²   Estimated body mass index is 20.16 kg/m² as calculated from the following:    Height as of this encounter: 149.9 cm (59\").    Weight as of this encounter: 45.3 kg (99 lb 12.8 oz).       BMI is within normal parameters. No other follow-up for BMI required.      Physical Exam  Vitals and nursing note reviewed.   Constitutional:       General: She is not in acute distress.     Appearance: She is well-developed.   HENT:      Head: Normocephalic.      Nose: Nose normal.   Cardiovascular:      Rate and Rhythm: Normal rate and regular rhythm.      Heart sounds: Normal heart sounds. No murmur heard.  Pulmonary:      Effort: Pulmonary effort is normal. No respiratory distress.      Breath sounds: Normal breath sounds.   Musculoskeletal:         General: Normal range of motion.   Skin:     General: Skin is warm and dry.      Findings: No rash.   Neurological:      " Mental Status: She is alert and oriented to person, place, and time.   Psychiatric:         Behavior: Behavior normal.         Thought Content: Thought content normal.         Judgment: Judgment normal.        Result Review :                     Assessment and Plan     Diagnoses and all orders for this visit:    1. Health maintenance examination (Primary)    2. Screen for colon cancer  -     Cologuard - Stool, Per Rectum; Future    3. Screening for lipid disorders  -     Lipid Panel    4. Screening for diabetes mellitus  -     Comprehensive Metabolic Panel    5. Screening for thyroid disorder  -     TSH Rfx On Abnormal To Free T4    6. Screening, anemia, deficiency, iron  -     CBC & Differential    7. Perimenopausal  -     Follicle stimulating hormone  -     Luteinizing hormone    8. Encounter for hepatitis C screening test for low risk patient  -     Hepatitis C Antibody    9. Need for diphtheria-tetanus-pertussis (Tdap) vaccine  -     Tdap Vaccine => 8yo IM (BOOSTRIX/ADACEL)    Here for annual exam, fasting labs ordered as above, immunizations up-to-date, colon cancer screening ordered as above, GYN health up-to-date with gynecologist, cardiovascular screening negative for symptoms, counseled patient to increase vegetable intake, water and 150 minutes of exercise weekly combining weightbearing exercises and aerobic activity.           Follow Up     Return in about 1 year (around 7/8/2025), or if symptoms worsen or fail to improve, for Annual Physical with fasting labs prior.  Patient was given instructions and counseling regarding her condition or for health maintenance advice. Please see specific information pulled into the AVS if appropriate.

## 2024-07-09 LAB
ALBUMIN SERPL-MCNC: 5.2 G/DL (ref 3.5–5.2)
ALBUMIN/GLOB SERPL: 2 G/DL
ALP SERPL-CCNC: 14 U/L (ref 39–117)
ALT SERPL-CCNC: 9 U/L (ref 1–33)
AST SERPL-CCNC: 17 U/L (ref 1–32)
BASOPHILS # BLD AUTO: 0.02 10*3/MM3 (ref 0–0.2)
BASOPHILS NFR BLD AUTO: 0.2 % (ref 0–1.5)
BILIRUB SERPL-MCNC: 0.3 MG/DL (ref 0–1.2)
BUN SERPL-MCNC: 14 MG/DL (ref 6–20)
BUN/CREAT SERPL: 21.2 (ref 7–25)
CALCIUM SERPL-MCNC: 9.7 MG/DL (ref 8.6–10.5)
CHLORIDE SERPL-SCNC: 98 MMOL/L (ref 98–107)
CHOLEST SERPL-MCNC: 192 MG/DL (ref 0–200)
CO2 SERPL-SCNC: 25.8 MMOL/L (ref 22–29)
CREAT SERPL-MCNC: 0.66 MG/DL (ref 0.57–1)
EGFRCR SERPLBLD CKD-EPI 2021: 110.4 ML/MIN/1.73
EOSINOPHIL # BLD AUTO: 0.04 10*3/MM3 (ref 0–0.4)
EOSINOPHIL NFR BLD AUTO: 0.4 % (ref 0.3–6.2)
ERYTHROCYTE [DISTWIDTH] IN BLOOD BY AUTOMATED COUNT: 12.9 % (ref 12.3–15.4)
FSH SERPL-ACNC: 20.6 MIU/ML
GLOBULIN SER CALC-MCNC: 2.6 GM/DL
GLUCOSE SERPL-MCNC: 86 MG/DL (ref 65–99)
HCT VFR BLD AUTO: 38.9 % (ref 34–46.6)
HCV IGG SERPL QL IA: NON REACTIVE
HDLC SERPL-MCNC: 90 MG/DL (ref 40–60)
HGB BLD-MCNC: 12.7 G/DL (ref 12–15.9)
IMM GRANULOCYTES # BLD AUTO: 0.04 10*3/MM3 (ref 0–0.05)
IMM GRANULOCYTES NFR BLD AUTO: 0.4 % (ref 0–0.5)
LDLC SERPL CALC-MCNC: 89 MG/DL (ref 0–100)
LH SERPL-ACNC: 11.2 MIU/ML
LYMPHOCYTES # BLD AUTO: 2.66 10*3/MM3 (ref 0.7–3.1)
LYMPHOCYTES NFR BLD AUTO: 26.7 % (ref 19.6–45.3)
MCH RBC QN AUTO: 30.9 PG (ref 26.6–33)
MCHC RBC AUTO-ENTMCNC: 32.6 G/DL (ref 31.5–35.7)
MCV RBC AUTO: 94.6 FL (ref 79–97)
MONOCYTES # BLD AUTO: 0.5 10*3/MM3 (ref 0.1–0.9)
MONOCYTES NFR BLD AUTO: 5 % (ref 5–12)
NEUTROPHILS # BLD AUTO: 6.69 10*3/MM3 (ref 1.7–7)
NEUTROPHILS NFR BLD AUTO: 67.3 % (ref 42.7–76)
NRBC BLD AUTO-RTO: 0 /100 WBC (ref 0–0.2)
PLATELET # BLD AUTO: 387 10*3/MM3 (ref 140–450)
POTASSIUM SERPL-SCNC: 4.6 MMOL/L (ref 3.5–5.2)
PROT SERPL-MCNC: 7.8 G/DL (ref 6–8.5)
RBC # BLD AUTO: 4.11 10*6/MM3 (ref 3.77–5.28)
SODIUM SERPL-SCNC: 137 MMOL/L (ref 136–145)
TRIGL SERPL-MCNC: 71 MG/DL (ref 0–150)
TSH SERPL DL<=0.005 MIU/L-ACNC: 2.95 UIU/ML (ref 0.27–4.2)
VLDLC SERPL CALC-MCNC: 13 MG/DL (ref 5–40)
WBC # BLD AUTO: 9.95 10*3/MM3 (ref 3.4–10.8)

## 2024-07-09 NOTE — PROGRESS NOTES
Please call patient back with results.  The labs has resulted as normal kidney and liver function, excellent cholesterol, female hormones are not in the postmenopausal range but they are on the higher end so it may be a little closer, definitely perimenopausal.  Blood counts, thyroid and hepatitis C screen are normal.  Please let us know if you have further questions.     Thank you

## 2025-06-18 ENCOUNTER — HOSPITAL ENCOUNTER (OUTPATIENT)
Facility: HOSPITAL | Age: 45
Discharge: HOME | End: 2025-06-18
Admitting: NURSE PRACTITIONER
Payer: COMMERCIAL

## 2025-06-18 ENCOUNTER — OFFICE VISIT (OUTPATIENT)
Dept: OBSTETRICS AND GYNECOLOGY | Age: 45
End: 2025-06-18
Payer: COMMERCIAL

## 2025-06-18 VITALS
DIASTOLIC BLOOD PRESSURE: 78 MMHG | BODY MASS INDEX: 20.52 KG/M2 | SYSTOLIC BLOOD PRESSURE: 124 MMHG | WEIGHT: 101.8 LBS | HEIGHT: 59 IN

## 2025-06-18 DIAGNOSIS — Z12.31 ENCOUNTER FOR SCREENING MAMMOGRAM FOR MALIGNANT NEOPLASM OF BREAST: Primary | ICD-10-CM

## 2025-06-18 DIAGNOSIS — Z01.419 ENCOUNTER FOR GYNECOLOGICAL EXAMINATION WITHOUT ABNORMAL FINDING: ICD-10-CM

## 2025-06-18 DIAGNOSIS — Z12.31 ENCOUNTER FOR SCREENING MAMMOGRAM FOR MALIGNANT NEOPLASM OF BREAST: ICD-10-CM

## 2025-06-18 PROCEDURE — 77067 SCR MAMMO BI INCL CAD: CPT

## 2025-06-18 PROCEDURE — 77063 BREAST TOMOSYNTHESIS BI: CPT

## 2025-06-18 RX ORDER — NORETHINDRONE ACETATE AND ETHINYL ESTRADIOL, ETHINYL ESTRADIOL AND FERROUS FUMARATE 1MG-10(24)
1 KIT ORAL DAILY
Qty: 84 TABLET | Refills: 4 | Status: SHIPPED | OUTPATIENT
Start: 2025-06-18

## 2025-06-18 NOTE — PROGRESS NOTES
Routine Annual Visit    2025    Patient: Opal Morales          MR#:[id number]      Chief Complaint   Patient presents with    Gynecologic Exam     AE Today, Last pap 2023 (-), HPV (-), MG scheduled today, Cologuard 2024       History of Present Illness    45 y.o. female  who presents for annual exam.   Very happy with lo loestrin and desires to continue. Only occasional bleeding  No menopause sx starting, unsure age of menopause    No LMP recorded. (Menstrual status: Oral contraceptives).  Obstetric History:  OB History          3    Para   3    Term   3            AB        Living   3         SAB        IAB        Ectopic        Molar        Multiple        Live Births   3               Menstrual History:     No LMP recorded. (Menstrual status: Oral contraceptives).       ________________________________________  Patient Active Problem List   Diagnosis    Anxiety    Irregular menses       Past Medical History:   Diagnosis Date    Abnormal Pap smear of cervix     Anxiety     Irregular menses     PONV (postoperative nausea and vomiting)     Varicella        Family History   Problem Relation Age of Onset    Lung cancer Mother 41        smoker    Cancer Mother         Lung Cancer    Early death Mother     Stroke Mother     Heart disease Maternal Aunt     Heart attack Maternal Grandfather     Heart disease Maternal Grandfather     Prostate cancer Father     No Known Problems Brother     Stroke Maternal Grandmother     Lung cancer Maternal Aunt     Cancer Maternal Aunt         Lymphoma    Cancer Maternal Aunt     Heart disease Maternal Aunt     Breast cancer Neg Hx     Ovarian cancer Neg Hx     Uterine cancer Neg Hx     Colon cancer Neg Hx        Past Surgical History:   Procedure Laterality Date    BREAST AUGMENTATION      WISDOM TOOTH EXTRACTION         Social History     Tobacco Use   Smoking Status Never   Smokeless Tobacco Not on file       has a current medication list which  "includes the following prescription(s):  loestrin fe.  ________________________________________        Objective   Physical Exam    /78   Ht 149.9 cm (59\")   Wt 46.2 kg (101 lb 12.8 oz)   BMI 20.56 kg/m²    BP Readings from Last 3 Encounters:   06/18/25 124/78   07/08/24 122/80   06/13/24 100/62      Wt Readings from Last 3 Encounters:   06/18/25 46.2 kg (101 lb 12.8 oz)   07/08/24 45.3 kg (99 lb 12.8 oz)   06/13/24 44.9 kg (99 lb)         BMI: Body mass index is 20.56 kg/m².       General:   alert, appears stated age, and cooperative   Neck: No thyromegaly or LAD   Abdomen: soft, non-tender, without masses or organomegaly   Breast: inspection negative, no nipple discharge or bleeding, no masses or nodularity palpable   Urethra and bladder: urethral meatus normal; bladder nontender to palpation;   Vulva: normal, Bartholin's, Urethra, [place]'s normal   Vagina: normal mucosa, normal discharge   Cervix: multiparous appearance and no lesions   Uterus: A bit enlarged and possibly an anterior fibroid   Adnexa: normal adnexa and no mass, fullness, tenderness       Assessment:    normal annual exam   Possible fibroids?  Contraception  Possible perimenopause    Plan:    Plan     [x]  Mammogram request made  [x]  PAP done  []  Labs:   []  GC/Chl/TV  []  DEXA scan   []  Ref[facility] colonoscopy:     [facility]  Her uterus seems a bit enlarged and possible fibroid but asymptomatic. Offered us but declines for now    Counseling  [x] Menopause  [x]  Nutrition  [x]  Physical activity/regular exercise   []  Healthy weight  []  Injury prevention  []  Smoking cessation  []  Substance misuse/abuse  [x]  Sexual behavior  []  STD prevention  [x]  Contraception  []  Dental health  []  Mental health  []  Immunization  [x]  Encouraged SBANGELA Tracey MD  06/18/2025  09:53 EDT    "

## 2025-06-23 LAB
CYTOLOGIST CVX/VAG CYTO: ABNORMAL
CYTOLOGY CVX/VAG DOC CYTO: ABNORMAL
CYTOLOGY CVX/VAG DOC THIN PREP: ABNORMAL
DX ICD CODE: ABNORMAL
DX ICD CODE: ABNORMAL
HPV I/H RISK 4 DNA CVX QL PROBE+SIG AMP: NEGATIVE
OTHER STN SPEC: ABNORMAL
PATHOLOGIST CVX/VAG CYTO: ABNORMAL
RECOM F/U CVX/VAG CYTO: ABNORMAL
SERVICE CMNT-IMP: ABNORMAL
STAT OF ADQ CVX/VAG CYTO-IMP: ABNORMAL

## 2025-07-10 ENCOUNTER — OFFICE VISIT (OUTPATIENT)
Dept: FAMILY MEDICINE CLINIC | Facility: CLINIC | Age: 45
End: 2025-07-10
Payer: COMMERCIAL

## 2025-07-10 VITALS
OXYGEN SATURATION: 98 % | BODY MASS INDEX: 20.4 KG/M2 | HEART RATE: 71 BPM | WEIGHT: 101 LBS | DIASTOLIC BLOOD PRESSURE: 66 MMHG | TEMPERATURE: 98.7 F | SYSTOLIC BLOOD PRESSURE: 104 MMHG

## 2025-07-10 DIAGNOSIS — Z13.29 SCREENING FOR THYROID DISORDER: ICD-10-CM

## 2025-07-10 DIAGNOSIS — R11.0 NAUSEA: ICD-10-CM

## 2025-07-10 DIAGNOSIS — Z13.0 SCREENING, ANEMIA, DEFICIENCY, IRON: ICD-10-CM

## 2025-07-10 DIAGNOSIS — E55.9 HYPOVITAMINOSIS D: ICD-10-CM

## 2025-07-10 DIAGNOSIS — Z13.220 SCREENING FOR LIPID DISORDERS: ICD-10-CM

## 2025-07-10 DIAGNOSIS — Z13.1 SCREENING FOR DIABETES MELLITUS: ICD-10-CM

## 2025-07-10 DIAGNOSIS — Z00.00 HEALTH MAINTENANCE EXAMINATION: Primary | ICD-10-CM

## 2025-07-10 DIAGNOSIS — M54.50 ACUTE BILATERAL LOW BACK PAIN WITHOUT SCIATICA: ICD-10-CM

## 2025-07-10 DIAGNOSIS — N95.1 PERIMENOPAUSAL: ICD-10-CM

## 2025-07-10 RX ORDER — CYCLOBENZAPRINE HCL 5 MG
5 TABLET ORAL 3 TIMES DAILY PRN
Qty: 30 TABLET | Refills: 2 | Status: SHIPPED | OUTPATIENT
Start: 2025-07-10

## 2025-07-10 RX ORDER — ONDANSETRON 8 MG/1
8 TABLET, ORALLY DISINTEGRATING ORAL EVERY 8 HOURS PRN
Qty: 15 TABLET | Refills: 3 | Status: SHIPPED | OUTPATIENT
Start: 2025-07-10

## 2025-07-10 NOTE — PROGRESS NOTES
Chief Complaint  Annual Exam (Pulled a  muscle in the back a week ago  but yesterday went to the gym still [place]  ,fasting for labs ) and Back Pain (Very active last week pulled muscle  that is not getting any better )    Subjective        Opal Morales presents to [facility]  Back Pain    History of Present Illness  45-year-old female presents for physical exam.    No significant health changes since last year. Menstrual cycles occur approximately once every six months. Needs vision check; started using reading glasses. No dermatological concerns. Fasting today, only consumed water.    Menstrual Cycle Irregularities  Menstrual cycles occur approximately once every six months.  - Onset: Not specified.  - Duration: Occurs approximately once every six months.    Vision Changes  Needs vision check; started using reading glasses.  - Onset: Not specified.  - Duration: Not specified.  - Character: Started using reading glasses.    Back Injury  Back injury a week ago from gym workouts. Continued exercise routine, including cardio and arm exercises. No numbness or tingling, mild sciatic pain. Pain does not interfere with sleep or daily activities, relieved when lying flat. Discontinued heavy lifting due to discomfort. No home physical therapy exercises.  - Onset: A week ago from gym workouts.  - Location: Back.  - Duration: A week.  - Character: Mild sciatic pain, no numbness or tingling.  - Alleviating/Aggravating Factors: Relieved when lying flat, discontinued heavy lifting due to discomfort.  - Timing: Pain does not interfere with sleep or daily activities.    Stomach Flu Relief  Requests Zofran 8 mg prescription for recent stomach flu relief.  - Onset: Recent.  - Character: Stomach flu.  - Alleviating Factors: Requests Zofran 8 mg prescription.    SOCIAL HISTORY  - Works as a teacher       Objective   Vital Signs:  /66   Pulse 71   Temp 98.7 °F (37.1 °C)   Wt 45.8 kg (101 lb)    "SpO2 98%   BMI 20.40 kg/m²   Estimated body mass index is 20.4 kg/m² as calculated from the following:    Height as of 6/18/25: 149.9 cm (59\").    Weight as of this encounter: 45.8 kg (101 lb).    BMI is within normal parameters. No other follow-up for BMI required.    Physical Exam  Vitals and nursing note reviewed.   Constitutional:       General: She is not in acute distress.     Appearance: Normal appearance. She is well-developed.   HENT:      Head: Normocephalic.      Right Ear: Tympanic membrane and ear canal normal. There is no impacted cerumen.      Left Ear: Tympanic membrane and ear canal normal. There is no impacted cerumen.      Nose: Nose normal.   Eyes:      Conjunctiva/sclera: Conjunctivae normal.      Pupils: Pupils are equal, round, and reactive to light.   Neck:      Vascular: No carotid bruit.   Cardiovascular:      Rate and Rhythm: Normal rate and regular rhythm.      Heart sounds: Normal heart sounds. No murmur heard.  Pulmonary:      Effort: Pulmonary effort is normal. No respiratory distress.      Breath sounds: Normal breath sounds.   Abdominal:      General: Abdomen is flat. Bowel sounds are normal. There is no distension.      Tenderness: There is no abdominal tenderness.   Musculoskeletal:         General: Normal range of motion.   Skin:     General: Skin is warm and dry.      Findings: No rash.   Neurological:      Mental Status: She is alert and oriented to person, place, and time.   Psychiatric:         Behavior: Behavior normal.         Thought Content: Thought content normal.         Judgment: Judgment normal.        Physical Exam  Lungs auscultated. Abdomen examined.      Result Review :                  Assessment and Plan   Diagnoses and all orders for this visit:    1. Health maintenance examination (Primary)    2. Acute bilateral low back pain without sciatica  -     Ambulatory Referral to Physical Therapy for Evaluation & Treatment  -     cyclobenzaprine (FLEXERIL) 5 MG " tablet; Take 1 tablet by mouth 3 (Three) Times a Day As Needed for Muscle Spasms (Pain).  Dispense: 30 tablet; Refill: 2    3. Perimenopausal  -     Follicle stimulating hormone  -     Luteinizing hormone    4. Screening for diabetes mellitus  -     Comprehensive Metabolic Panel    5. Screening, anemia, deficiency, iron  -     CBC & Differential    6. Screening for lipid disorders  -     Lipid Panel    7. Screening for thyroid disorder  -     TSH Rfx On Abnormal To Free T4    8. Hypovitaminosis D  -     Vitamin D,25-Hydroxy    9. Nausea  -     ondansetron ODT (ZOFRAN-ODT) 8 MG disintegrating tablet; Place 1 tablet on the tongue Every 8 (Eight) Hours As Needed for Nausea or Vomiting.  Dispense: 15 tablet; Refill: 3           Assessment & Plan  1. Back pain: Acute problem for 2 weeks  - Likely muscle strain from recent physical activity  - No numbness or tingling  - Does not affect sleep  - Relieved when lying flat  - Referred to [facility]  - Prescribed low-dose muscle relaxer for night use and as needed for future tightness  - Follow-up in 6 to 8 weeks if not improving    2. Tinea versicolor:  - Fungal infection likely due to changes in skin pH from oil use  - Prescribed ketoconazole shampoo as body wash, leave on for 5-10 minutes during showers for 3 months, then twice weekly for maintenance  - Selsun Blue shampoo as alternative    3. Nausea:  - Prescribed Zofran 8 mg dissolvable tablets, insurance covers about 15 tablets with refills    4. Health maintenance:  - Fasting blood work today to assess thyroid, kidney, liver function, cholesterol, blood counts, vitamin D, FSH, and LH levels for perimenopausal status  - Up-to-date with gynecologic health, mammogram and Pap smear  - Advised patient to continue with healthy eating exercise as she is already doing  - Continue with regular dental and vision checks      Follow Up   Return in about 1 year (around 7/10/2026), or if symptoms worsen or fail to  improve, for Annual Physical with fasting labs prior.  Patient was given instructions and counseling regarding her condition or for health maintenance advice. Please see specific information pulled into the AVS if appropriate.         Jazmyne Hobbs MD      Patient or patient representative verbalized consent for the use of Ambient Listening during the visit with  Jazmyne Hobbs MD for chart documentation. 7/10/2025  10:12 EDT

## 2025-07-11 LAB
25(OH)D3+25(OH)D2 SERPL-MCNC: 67.4 NG/ML (ref 30–100)
ALBUMIN SERPL-MCNC: 5.1 G/DL (ref 3.5–5.2)
ALBUMIN/GLOB SERPL: 2 G/DL
ALP SERPL-CCNC: 12 U/L (ref 39–117)
ALT SERPL-CCNC: 8 U/L (ref 1–33)
AST SERPL-CCNC: 18 U/L (ref 1–32)
BASOPHILS # BLD AUTO: 0.03 10*3/MM3 (ref 0–0.2)
BASOPHILS NFR BLD AUTO: 0.5 % (ref 0–1.5)
BILIRUB SERPL-MCNC: 0.2 MG/DL (ref 0–1.2)
BUN SERPL-MCNC: 15 MG/DL (ref 6–20)
BUN/CREAT SERPL: 23.1 (ref 7–25)
CALCIUM SERPL-MCNC: 9.9 MG/DL (ref 8.6–10.5)
CHLORIDE SERPL-SCNC: 103 MMOL/L (ref 98–107)
CHOLEST SERPL-MCNC: 186 MG/DL (ref 0–200)
CO2 SERPL-SCNC: 23.6 MMOL/L (ref 22–29)
CREAT SERPL-MCNC: 0.65 MG/DL (ref 0.57–1)
EGFRCR SERPLBLD CKD-EPI 2021: 110.1 ML/MIN/1.73
EOSINOPHIL # BLD AUTO: 0.07 10*3/MM3 (ref 0–0.4)
EOSINOPHIL NFR BLD AUTO: 1.2 % (ref 0.3–6.2)
ERYTHROCYTE [DISTWIDTH] IN BLOOD BY AUTOMATED COUNT: 13.1 % (ref 12.3–15.4)
FSH SERPL-ACNC: 4.5 MIU/ML
GLOBULIN SER CALC-MCNC: 2.5 GM/DL
GLUCOSE SERPL-MCNC: 82 MG/DL (ref 65–99)
HCT VFR BLD AUTO: 40.3 % (ref 34–46.6)
HDLC SERPL-MCNC: 83 MG/DL (ref 40–60)
HGB BLD-MCNC: 13.2 G/DL (ref 12–15.9)
IMM GRANULOCYTES # BLD AUTO: 0.01 10*3/MM3 (ref 0–0.05)
IMM GRANULOCYTES NFR BLD AUTO: 0.2 % (ref 0–0.5)
LDLC SERPL CALC-MCNC: 90 MG/DL (ref 0–100)
LH SERPL-ACNC: 1.9 MIU/ML
LYMPHOCYTES # BLD AUTO: 2.68 10*3/MM3 (ref 0.7–3.1)
LYMPHOCYTES NFR BLD AUTO: 45.6 % (ref 19.6–45.3)
MCH RBC QN AUTO: 32.2 PG (ref 26.6–33)
MCHC RBC AUTO-ENTMCNC: 32.8 G/DL (ref 31.5–35.7)
MCV RBC AUTO: 98.3 FL (ref 79–97)
MONOCYTES # BLD AUTO: 0.44 10*3/MM3 (ref 0.1–0.9)
MONOCYTES NFR BLD AUTO: 7.5 % (ref 5–12)
NEUTROPHILS # BLD AUTO: 2.65 10*3/MM3 (ref 1.7–7)
NEUTROPHILS NFR BLD AUTO: 45 % (ref 42.7–76)
NRBC BLD AUTO-RTO: 0 /100 WBC (ref 0–0.2)
PLATELET # BLD AUTO: 344 10*3/MM3 (ref 140–450)
POTASSIUM SERPL-SCNC: 5.1 MMOL/L (ref 3.5–5.2)
PROT SERPL-MCNC: 7.6 G/DL (ref 6–8.5)
RBC # BLD AUTO: 4.1 10*6/MM3 (ref 3.77–5.28)
SODIUM SERPL-SCNC: 139 MMOL/L (ref 136–145)
TRIGL SERPL-MCNC: 73 MG/DL (ref 0–150)
TSH SERPL DL<=0.005 MIU/L-ACNC: 2.28 UIU/ML (ref 0.27–4.2)
VLDLC SERPL CALC-MCNC: 13 MG/DL (ref 5–40)
WBC # BLD AUTO: 5.88 10*3/MM3 (ref 3.4–10.8)